# Patient Record
Sex: FEMALE | NOT HISPANIC OR LATINO | Employment: OTHER | URBAN - METROPOLITAN AREA
[De-identification: names, ages, dates, MRNs, and addresses within clinical notes are randomized per-mention and may not be internally consistent; named-entity substitution may affect disease eponyms.]

---

## 2021-06-08 ENCOUNTER — TRANSCRIBE ORDERS (OUTPATIENT)
Dept: PREADMISSION TESTING | Facility: HOSPITAL | Age: 66
End: 2021-06-08

## 2021-06-08 DIAGNOSIS — Z01.818 OTHER SPECIFIED PRE-OPERATIVE EXAMINATION: Primary | ICD-10-CM

## 2021-06-10 ENCOUNTER — HOSPITAL ENCOUNTER (OUTPATIENT)
Dept: GENERAL RADIOLOGY | Facility: HOSPITAL | Age: 66
Discharge: HOME | End: 2021-06-10

## 2021-06-10 ENCOUNTER — PRE-ADMISSION TESTING (OUTPATIENT)
Dept: PREADMISSION TESTING | Facility: HOSPITAL | Age: 66
End: 2021-06-10

## 2021-06-10 VITALS
DIASTOLIC BLOOD PRESSURE: 90 MMHG | WEIGHT: 218 LBS | HEIGHT: 67 IN | SYSTOLIC BLOOD PRESSURE: 150 MMHG | TEMPERATURE: 98.1 F | BODY MASS INDEX: 34.21 KG/M2 | RESPIRATION RATE: 16 BRPM | OXYGEN SATURATION: 98 % | HEART RATE: 91 BPM

## 2021-06-10 LAB
ALBUMIN SERPL-MCNC: 4.1 G/DL (ref 3.5–5.2)
ALBUMIN/GLOB SERPL: 1.3 G/DL
ALP SERPL-CCNC: 61 U/L (ref 39–117)
ALT SERPL W P-5'-P-CCNC: 12 U/L (ref 1–33)
ANION GAP SERPL CALCULATED.3IONS-SCNC: 8.6 MMOL/L (ref 5–15)
AST SERPL-CCNC: 14 U/L (ref 1–32)
BACTERIA UR QL AUTO: NORMAL /HPF
BILIRUB SERPL-MCNC: 0.3 MG/DL (ref 0–1.2)
BILIRUB UR QL STRIP: NEGATIVE
BUN SERPL-MCNC: 8 MG/DL (ref 8–23)
BUN/CREAT SERPL: 9.9 (ref 7–25)
CALCIUM SPEC-SCNC: 8.9 MG/DL (ref 8.6–10.5)
CHLORIDE SERPL-SCNC: 106 MMOL/L (ref 98–107)
CLARITY UR: CLEAR
CO2 SERPL-SCNC: 27.4 MMOL/L (ref 22–29)
COLOR UR: YELLOW
CREAT SERPL-MCNC: 0.81 MG/DL (ref 0.57–1)
DEPRECATED RDW RBC AUTO: 49.3 FL (ref 37–54)
ERYTHROCYTE [DISTWIDTH] IN BLOOD BY AUTOMATED COUNT: 14.1 % (ref 12.3–15.4)
GFR SERPL CREATININE-BSD FRML MDRD: 71 ML/MIN/1.73
GFR SERPL CREATININE-BSD FRML MDRD: 86 ML/MIN/1.73
GLOBULIN UR ELPH-MCNC: 3.1 GM/DL
GLUCOSE SERPL-MCNC: 83 MG/DL (ref 65–99)
GLUCOSE UR STRIP-MCNC: NEGATIVE MG/DL
HCT VFR BLD AUTO: 41.1 % (ref 34–46.6)
HGB BLD-MCNC: 13.2 G/DL (ref 12–15.9)
HGB UR QL STRIP.AUTO: NEGATIVE
HYALINE CASTS UR QL AUTO: NORMAL /LPF
KETONES UR QL STRIP: ABNORMAL
LEUKOCYTE ESTERASE UR QL STRIP.AUTO: NEGATIVE
MCH RBC QN AUTO: 30.3 PG (ref 26.6–33)
MCHC RBC AUTO-ENTMCNC: 32.1 G/DL (ref 31.5–35.7)
MCV RBC AUTO: 94.3 FL (ref 79–97)
NITRITE UR QL STRIP: NEGATIVE
PH UR STRIP.AUTO: 6 [PH] (ref 5–8)
PLATELET # BLD AUTO: 215 10*3/MM3 (ref 140–450)
PMV BLD AUTO: 9.7 FL (ref 6–12)
POTASSIUM SERPL-SCNC: 3.8 MMOL/L (ref 3.5–5.2)
PROT SERPL-MCNC: 7.2 G/DL (ref 6–8.5)
PROT UR QL STRIP: NEGATIVE
QT INTERVAL: 375 MS
RBC # BLD AUTO: 4.36 10*6/MM3 (ref 3.77–5.28)
RBC # UR: NORMAL /HPF
REF LAB TEST METHOD: NORMAL
SODIUM SERPL-SCNC: 142 MMOL/L (ref 136–145)
SP GR UR STRIP: 1.02 (ref 1–1.03)
SQUAMOUS #/AREA URNS HPF: NORMAL /HPF
UROBILINOGEN UR QL STRIP: ABNORMAL
WBC # BLD AUTO: 2.97 10*3/MM3 (ref 3.4–10.8)
WBC UR QL AUTO: NORMAL /HPF

## 2021-06-10 PROCEDURE — 36415 COLL VENOUS BLD VENIPUNCTURE: CPT

## 2021-06-10 PROCEDURE — 71046 X-RAY EXAM CHEST 2 VIEWS: CPT

## 2021-06-10 PROCEDURE — 85027 COMPLETE CBC AUTOMATED: CPT

## 2021-06-10 PROCEDURE — 93005 ELECTROCARDIOGRAM TRACING: CPT

## 2021-06-10 PROCEDURE — 93010 ELECTROCARDIOGRAM REPORT: CPT | Performed by: INTERNAL MEDICINE

## 2021-06-10 PROCEDURE — 80053 COMPREHEN METABOLIC PANEL: CPT

## 2021-06-10 PROCEDURE — 81001 URINALYSIS AUTO W/SCOPE: CPT

## 2021-06-10 RX ORDER — LOSARTAN POTASSIUM 50 MG/1
50 TABLET ORAL DAILY
COMMUNITY

## 2021-06-10 RX ORDER — HYDROCHLOROTHIAZIDE 25 MG/1
25 TABLET ORAL DAILY
COMMUNITY

## 2021-06-10 RX ORDER — TIZANIDINE 4 MG/1
4 TABLET ORAL NIGHTLY PRN
COMMUNITY

## 2021-06-10 RX ORDER — GABAPENTIN 800 MG/1
800 TABLET ORAL EVERY 4 HOURS PRN
COMMUNITY

## 2021-06-10 RX ORDER — HYDROCODONE BITARTRATE AND ACETAMINOPHEN 10; 325 MG/1; MG/1
1 TABLET ORAL EVERY 6 HOURS PRN
COMMUNITY
End: 2021-06-22 | Stop reason: HOSPADM

## 2021-06-10 ASSESSMENT — KOOS JR
KOOS JR SCORE: 28
KOOS JR SCORE: 0

## 2021-06-10 NOTE — DISCHARGE INSTRUCTIONS
CHLORHEXIDINE CLOTH INSTRUCTIONS  The morning of surgery follow these instructions using the Chlorhexidine cloths you've been given.  These steps reduce bacteria on the body.  Do not use the cloths near your eyes, ears mouth, genitalia or on open wounds.  Throw the cloths away after use but do not try to flush them down a toilet.      • Open and remove one cloth at a time from the package.    • Leave the cloth unfolded and begin the bathing.  • Massage the skin with the cloths using gentle pressure to remove bacteria.  Do not scrub harshly.   • Follow the steps below with one 2% CHG cloth per area (6 total cloths).  • One cloth for neck, shoulders and chest.  • One cloth for both arms, hands, fingers and underarms (do underarms last).  • One cloth for the abdomen followed by groin.  • One cloth for right leg and foot including between the toes.  • One cloth for left leg and foot including between the toes.  • The last cloth is to be used for the back of the neck, back and buttocks.    Allow the CHG to air dry 3 minutes on the skin which will give it time to work and decrease the chance of irritation.  The skin may feel sticky until it is dry.  Do not rinse with water or any other liquid or you will lose the beneficial effects of the CHG.  If mild skin irritation occurs, do rinse the skin to remove the CHG.  Report this to the nurse at time of admission.  Do not apply lotions, creams, ointments, deodorants or perfumes after using the clothes. Dress in clean clothes before coming to the hospital.    BACTROBAN NASAL OINTMENT  There are many germs normally in your nose. Bactroban is an ointment that will help reduce these germs. Please follow these instructions for Bactroban use:      ____The day before surgery in the morning  Date____6/20____    ____The day before surgery in the evening              Date___6/20_____    ____The day of surgery in the morning    Date__6/21______    **Squirt ½ package of Bactroban Ointment  onto a cotton applicator and apply to inside of 1st nostril.  Squirt the remaining Bactroban and apply to the inside of the other nostril.    ARRIVAL TIME 08:00      If you are on prescription narcotic pain medication to control your pain you may also take that medication the morning of surgery.    General Instructions:  • Do not eat solid food after midnight the night before surgery.  • You may drink clear liquids day of surgery but must stop at least one hour before your hospital arrival time.  • It is beneficial for you to have a clear drink that contains carbohydrates the day of surgery.  We suggest a 12 to 20 ounce bottle of Gatorade or Powerade for non-diabetic patients or a 12 to 20 ounce bottle of G2 or Powerade Zero for diabetic patients. (Pediatric patients, are not advised to drink a 12 to 20 ounce carbohydrate drink)    Clear liquids are liquids you can see through.  Nothing red in color.     Plain water                               Sports drinks  Sodas                                   Gelatin (Jell-O)  Fruit juices without pulp such as white grape juice and apple juice  Popsicles that contain no fruit or yogurt  Tea or coffee (no cream or milk added)  Gatorade / Powerade  G2 / Powerade Zero    • Patients who avoid smoking, chewing tobacco and alcohol for 4 weeks prior to surgery have a reduced risk of post-operative complications.  Quit smoking as many days before surgery as you can.  • Do not smoke, use chewing tobacco or drink alcohol the day of surgery.   • If applicable bring your C-PAP/ BI-PAP machine.  • Bring any papers given to you in the doctor’s office.  • Wear clean comfortable clothes.  • Do not wear contact lenses, false eyelashes or make-up.  Bring a case for your glasses.   • Bring crutches or walker if applicable.  • Remove all piercings.  Leave jewelry and any other valuables at home.  • Hair extensions with metal clips must be removed prior to surgery.  • The Pre-Admission Testing  nurse will instruct you to bring medications if unable to obtain an accurate list in Pre-Admission Testing.        Preventing a Surgical Site Infection:  • For 2 to 3 days before surgery, avoid shaving with a razor because the razor can irritate skin and make it easier to develop an infection.    • Any areas of open skin can increase the risk of a post-operative wound infection by allowing bacteria to enter and travel throughout the body.  Notify your surgeon if you have any skin wounds / rashes even if it is not near the expected surgical site.  The area will need assessed to determine if surgery should be delayed until it is healed.  • The night prior to surgery shower using a fresh bar of anti-bacterial soap (such as Dial) and clean washcloth.  Sleep in a clean bed with clean clothing.  Do not allow pets to sleep with you.  • Shower on the morning of surgery using a fresh bar of anti-bacterial soap (such as Dial) and clean washcloth.  Dry with a clean towel and dress in clean clothing.  • Ask your surgeon if you will be receiving antibiotics prior to surgery.  • Make sure you, your family, and all healthcare providers clean their hands with soap and water or an alcohol based hand  before caring for you or your wound.    Day of surgery:  Your arrival time is approximately two hours before your scheduled surgery time.  Upon arrival, a Pre-op nurse and Anesthesiologist will review your health history, obtain vital signs, and answer questions you may have.  The only belongings needed at this time will be a list of your home medications and if applicable your C-PAP/BI-PAP machine.  A Pre-op nurse will start an IV and you may receive medication in preparation for surgery, including something to help you relax.     Please be aware that surgery does come with discomfort.  We want to make every effort to control your discomfort so please discuss any uncontrolled symptoms with your nurse.   Your doctor will most  likely have prescribed pain medications.      If you are going home after surgery you will receive individualized written care instructions before being discharged.  A responsible adult must drive you to and from the hospital on the day of your surgery and stay with you for 24 hours.  Discharge prescriptions can be filled by the hospital pharmacy during regular pharmacy hours.  If you are having surgery late in the day/evening your prescription may be e-prescribed to your pharmacy.  Please verify your pharmacy hours or chose a 24 hour pharmacy to avoid not having access to your prescription because your pharmacy has closed for the day.    If you are staying overnight following surgery, you will be transported to your hospital room following the recovery period.  [facility] has all private rooms.    If you have any questions please call Pre-Admission Testing at [phone].  Deductibles and co-payments are collected on the day of service. Please be prepared to pay the required co-pay, deductible or deposit on the day of service as defined by your plan.    Patient Education for Self-Quarantine Process    Following your COVID testing, we strongly recommend that you do not leave your home after you have been tested for COVID except to get medical care. This includes not going to work, school or to public areas.  If this is not possible for you to do please limit your activities to only required outings.  Be sure to wear a mask when you are with other people, practice social distancing and wash your hands frequently.      The following items provide additional details to keep you safe.  • Wash your hands with soap and water frequently for at least 20 seconds.   • Avoid touching your eyes, nose and mouth with unwashed hands.  • Do not share anything - utensils, towels, food from the same bowl.   • Have your own utensils, drinking glass, dishes, towels and bedding.   • Do not have visitors.   • Do use  FaceTime to stay in touch with family and friends.  • You should stay in a specific room away from others if possible.   • Stay at least 6 feet away from others in the home if you cannot have a dedicated room to yourself.   • Do not snuggle with your pet. While the CDC says there is no evidence that pets can spread COVID-19 or be infected from humans, it is probably best to avoid “petting, snuggling, being kissed or licked and sharing food (during self-quarantine)”, according to the CDC.   • Sanitize household surfaces daily. Include all high touch areas (door handles, light switches, phones, countertops, etc.)  • Do not share a bathroom with others, if possible.   • Wear a mask around others in your home if you are unable to stay in a separate room or 6 feet apart. If  you are unable to wear a mask, have your family member wear a mask if they must be within 6 feet of you.   Call your surgeon immediately if you experience any of the following symptoms:  • Sore Throat  • Shortness of Breath or difficulty breathing  • Cough  • Chills  • Body soreness or muscle pain  • Headache  • Fever  • New loss of taste or smell  • Do not arrive for your surgery ill.  Your procedure will need to be rescheduled to another time.  You will need to call your physician before the day of surgery to avoid any unnecessary exposure to hospital staff as well as other patients.

## 2021-06-18 ENCOUNTER — LAB (OUTPATIENT)
Dept: LAB | Facility: HOSPITAL | Age: 66
End: 2021-06-18

## 2021-06-18 DIAGNOSIS — Z01.818 OTHER SPECIFIED PRE-OPERATIVE EXAMINATION: ICD-10-CM

## 2021-06-18 LAB — SARS-COV-2 ORF1AB RESP QL NAA+PROBE: NOT DETECTED

## 2021-06-18 PROCEDURE — U0004 COV-19 TEST NON-CDC HGH THRU: HCPCS

## 2021-06-18 PROCEDURE — C9803 HOPD COVID-19 SPEC COLLECT: HCPCS

## 2021-06-21 ENCOUNTER — ANESTHESIA EVENT (OUTPATIENT)
Dept: PERIOP | Facility: HOSPITAL | Age: 66
End: 2021-06-21

## 2021-06-21 ENCOUNTER — ANESTHESIA (OUTPATIENT)
Dept: PERIOP | Facility: HOSPITAL | Age: 66
End: 2021-06-21

## 2021-06-21 ENCOUNTER — APPOINTMENT (OUTPATIENT)
Dept: GENERAL RADIOLOGY | Facility: HOSPITAL | Age: 66
End: 2021-06-21

## 2021-06-21 ENCOUNTER — HOSPITAL ENCOUNTER (OUTPATIENT)
Facility: HOSPITAL | Age: 66
Discharge: HOME HEALTH | End: 2021-06-22
Attending: ORTHOPAEDIC SURGERY | Admitting: ORTHOPAEDIC SURGERY

## 2021-06-21 DIAGNOSIS — M17.11 PRIMARY OSTEOARTHRITIS OF RIGHT KNEE: Primary | ICD-10-CM

## 2021-06-21 LAB
HCT VFR BLD AUTO: 36 % (ref 34–46.6)
HGB BLD-MCNC: 12.1 G/DL (ref 12–15.9)

## 2021-06-21 PROCEDURE — A9270 NON-COVERED ITEM OR SERVICE: HCPCS | Performed by: ORTHOPAEDIC SURGERY

## 2021-06-21 PROCEDURE — 25010000002 HYDROMORPHONE PER 4 MG: Performed by: NURSE ANESTHETIST, CERTIFIED REGISTERED

## 2021-06-21 PROCEDURE — 25010000002 NEOSTIGMINE 5 MG/10ML SOLUTION: Performed by: ANESTHESIOLOGY

## 2021-06-21 PROCEDURE — 73560 X-RAY EXAM OF KNEE 1 OR 2: CPT

## 2021-06-21 PROCEDURE — 25010000002 HYDROMORPHONE 1 MG/ML SOLUTION: Performed by: ORTHOPAEDIC SURGERY

## 2021-06-21 PROCEDURE — G0378 HOSPITAL OBSERVATION PER HR: HCPCS

## 2021-06-21 PROCEDURE — 25010000002 EPINEPHRINE 30 MG/30ML SOLUTION

## 2021-06-21 PROCEDURE — 25010000002 ONDANSETRON PER 1 MG: Performed by: ORTHOPAEDIC SURGERY

## 2021-06-21 PROCEDURE — 63710000001 GABAPENTIN 400 MG CAPSULE: Performed by: ORTHOPAEDIC SURGERY

## 2021-06-21 PROCEDURE — C1713 ANCHOR/SCREW BN/BN,TIS/BN: HCPCS | Performed by: ORTHOPAEDIC SURGERY

## 2021-06-21 PROCEDURE — 25010000003 CEFAZOLIN IN DEXTROSE 2-4 GM/100ML-% SOLUTION: Performed by: ORTHOPAEDIC SURGERY

## 2021-06-21 PROCEDURE — 25010000002 PROPOFOL 10 MG/ML EMULSION: Performed by: NURSE ANESTHETIST, CERTIFIED REGISTERED

## 2021-06-21 PROCEDURE — 25010000002 ONDANSETRON PER 1 MG: Performed by: ANESTHESIOLOGY

## 2021-06-21 PROCEDURE — C1776 JOINT DEVICE (IMPLANTABLE): HCPCS | Performed by: ORTHOPAEDIC SURGERY

## 2021-06-21 PROCEDURE — 85018 HEMOGLOBIN: CPT | Performed by: ORTHOPAEDIC SURGERY

## 2021-06-21 PROCEDURE — 25010000002 KETOROLAC TROMETHAMINE PER 15 MG

## 2021-06-21 PROCEDURE — 25010000002 MIDAZOLAM PER 1 MG: Performed by: ANESTHESIOLOGY

## 2021-06-21 PROCEDURE — C1889 IMPLANT/INSERT DEVICE, NOC: HCPCS | Performed by: ORTHOPAEDIC SURGERY

## 2021-06-21 PROCEDURE — 63710000001 LOSARTAN 50 MG TABLET: Performed by: ORTHOPAEDIC SURGERY

## 2021-06-21 PROCEDURE — 25010000002 KETOROLAC TROMETHAMINE PER 15 MG: Performed by: ANESTHESIOLOGY

## 2021-06-21 PROCEDURE — 25010000002 ROPIVACAINE PER 1 MG

## 2021-06-21 PROCEDURE — 25010000002 DEXAMETHASONE PER 1 MG: Performed by: NURSE ANESTHETIST, CERTIFIED REGISTERED

## 2021-06-21 PROCEDURE — 25010000002 FENTANYL CITRATE (PF) 50 MCG/ML SOLUTION: Performed by: NURSE ANESTHETIST, CERTIFIED REGISTERED

## 2021-06-21 PROCEDURE — 63710000001 HYDROCHLOROTHIAZIDE 25 MG TABLET: Performed by: ORTHOPAEDIC SURGERY

## 2021-06-21 PROCEDURE — 85014 HEMATOCRIT: CPT | Performed by: ORTHOPAEDIC SURGERY

## 2021-06-21 PROCEDURE — 63710000001 FAMOTIDINE 20 MG TABLET: Performed by: ORTHOPAEDIC SURGERY

## 2021-06-21 DEVICE — COMP FEM/KN PERSONA CR CMT COCR STD SZ8 RT: Type: IMPLANTABLE DEVICE | Site: KNEE | Status: FUNCTIONAL

## 2021-06-21 DEVICE — SCRW HEX PERSONA FML 2.5X25MM PK/2: Type: IMPLANTABLE DEVICE | Site: KNEE | Status: FUNCTIONAL

## 2021-06-21 DEVICE — DEV CONTRL TISS STRATAFIX SYMM PDS PLUS VIL CT-1 45CM: Type: IMPLANTABLE DEVICE | Site: KNEE | Status: FUNCTIONAL

## 2021-06-21 DEVICE — DEV CONTRL TISS STRATAFIX SPIRAL MNCRYL UD 3/0 PLS 30CM: Type: IMPLANTABLE DEVICE | Site: KNEE | Status: FUNCTIONAL

## 2021-06-21 DEVICE — ART/SRF KN PERSONA/VE MC EF 8TO11 10MM RT: Type: IMPLANTABLE DEVICE | Site: KNEE | Status: FUNCTIONAL

## 2021-06-21 DEVICE — STEM TIB/KN PERSONA CMT 5D SZE RT: Type: IMPLANTABLE DEVICE | Site: KNEE | Status: FUNCTIONAL

## 2021-06-21 DEVICE — PAT NXGN POR 10X32MM: Type: IMPLANTABLE DEVICE | Site: KNEE | Status: FUNCTIONAL

## 2021-06-21 DEVICE — CAP TOTL KN POROUS/HYBRID PRIMARY: Type: IMPLANTABLE DEVICE | Site: KNEE | Status: FUNCTIONAL

## 2021-06-21 DEVICE — CMT BONE R 1X40: Type: IMPLANTABLE DEVICE | Site: KNEE | Status: FUNCTIONAL

## 2021-06-21 RX ORDER — NALOXONE HCL 0.4 MG/ML
0.2 VIAL (ML) INJECTION AS NEEDED
Status: DISCONTINUED | OUTPATIENT
Start: 2021-06-21 | End: 2021-06-21 | Stop reason: HOSPADM

## 2021-06-21 RX ORDER — TIZANIDINE 4 MG/1
4 TABLET ORAL NIGHTLY PRN
Status: DISCONTINUED | OUTPATIENT
Start: 2021-06-21 | End: 2021-06-22 | Stop reason: HOSPADM

## 2021-06-21 RX ORDER — SODIUM CHLORIDE 0.9 % (FLUSH) 0.9 %
1-10 SYRINGE (ML) INJECTION AS NEEDED
Status: DISCONTINUED | OUTPATIENT
Start: 2021-06-21 | End: 2021-06-22 | Stop reason: HOSPADM

## 2021-06-21 RX ORDER — SODIUM CHLORIDE 0.9 % (FLUSH) 0.9 %
3 SYRINGE (ML) INJECTION EVERY 12 HOURS SCHEDULED
Status: DISCONTINUED | OUTPATIENT
Start: 2021-06-21 | End: 2021-06-21 | Stop reason: HOSPADM

## 2021-06-21 RX ORDER — DOCUSATE SODIUM 100 MG/1
100-200 CAPSULE, LIQUID FILLED ORAL 2 TIMES DAILY
Qty: 60 CAPSULE | Refills: 1 | Status: SHIPPED | OUTPATIENT
Start: 2021-06-21

## 2021-06-21 RX ORDER — ACETAMINOPHEN 325 MG/1
325 TABLET ORAL EVERY 4 HOURS PRN
Status: DISCONTINUED | OUTPATIENT
Start: 2021-06-21 | End: 2021-06-22 | Stop reason: HOSPADM

## 2021-06-21 RX ORDER — OXYCODONE AND ACETAMINOPHEN 10; 325 MG/1; MG/1
1 TABLET ORAL EVERY 4 HOURS PRN
Status: DISCONTINUED | OUTPATIENT
Start: 2021-06-21 | End: 2021-06-21 | Stop reason: HOSPADM

## 2021-06-21 RX ORDER — CEFAZOLIN SODIUM 2 G/100ML
2 INJECTION, SOLUTION INTRAVENOUS ONCE
Status: COMPLETED | OUTPATIENT
Start: 2021-06-21 | End: 2021-06-21

## 2021-06-21 RX ORDER — HYDROCODONE BITARTRATE AND ACETAMINOPHEN 7.5; 325 MG/1; MG/1
1 TABLET ORAL ONCE AS NEEDED
Status: DISCONTINUED | OUTPATIENT
Start: 2021-06-21 | End: 2021-06-21 | Stop reason: HOSPADM

## 2021-06-21 RX ORDER — FENTANYL CITRATE 50 UG/ML
50 INJECTION, SOLUTION INTRAMUSCULAR; INTRAVENOUS
Status: DISCONTINUED | OUTPATIENT
Start: 2021-06-21 | End: 2021-06-21 | Stop reason: HOSPADM

## 2021-06-21 RX ORDER — SODIUM CHLORIDE, SODIUM LACTATE, POTASSIUM CHLORIDE, CALCIUM CHLORIDE 600; 310; 30; 20 MG/100ML; MG/100ML; MG/100ML; MG/100ML
9 INJECTION, SOLUTION INTRAVENOUS CONTINUOUS
Status: DISCONTINUED | OUTPATIENT
Start: 2021-06-21 | End: 2021-06-21

## 2021-06-21 RX ORDER — LIDOCAINE HYDROCHLORIDE 20 MG/ML
INJECTION, SOLUTION INFILTRATION; PERINEURAL AS NEEDED
Status: DISCONTINUED | OUTPATIENT
Start: 2021-06-21 | End: 2021-06-21 | Stop reason: SURG

## 2021-06-21 RX ORDER — ASPIRIN 81 MG/1
81 TABLET ORAL EVERY 12 HOURS SCHEDULED
Status: DISCONTINUED | OUTPATIENT
Start: 2021-06-22 | End: 2021-06-22 | Stop reason: HOSPADM

## 2021-06-21 RX ORDER — PROMETHAZINE HYDROCHLORIDE 25 MG/1
25 TABLET ORAL ONCE AS NEEDED
Status: DISCONTINUED | OUTPATIENT
Start: 2021-06-21 | End: 2021-06-21 | Stop reason: HOSPADM

## 2021-06-21 RX ORDER — SODIUM CHLORIDE 0.9 % (FLUSH) 0.9 %
3 SYRINGE (ML) INJECTION EVERY 12 HOURS SCHEDULED
Status: DISCONTINUED | OUTPATIENT
Start: 2021-06-21 | End: 2021-06-22 | Stop reason: HOSPADM

## 2021-06-21 RX ORDER — PROMETHAZINE HYDROCHLORIDE 25 MG/1
25 SUPPOSITORY RECTAL ONCE AS NEEDED
Status: DISCONTINUED | OUTPATIENT
Start: 2021-06-21 | End: 2021-06-21 | Stop reason: HOSPADM

## 2021-06-21 RX ORDER — ONDANSETRON 2 MG/ML
4 INJECTION INTRAMUSCULAR; INTRAVENOUS EVERY 6 HOURS PRN
Status: DISCONTINUED | OUTPATIENT
Start: 2021-06-21 | End: 2021-06-22 | Stop reason: HOSPADM

## 2021-06-21 RX ORDER — LABETALOL HYDROCHLORIDE 5 MG/ML
INJECTION, SOLUTION INTRAVENOUS AS NEEDED
Status: DISCONTINUED | OUTPATIENT
Start: 2021-06-21 | End: 2021-06-21 | Stop reason: SURG

## 2021-06-21 RX ORDER — LOSARTAN POTASSIUM 50 MG/1
50 TABLET ORAL DAILY
Status: DISCONTINUED | OUTPATIENT
Start: 2021-06-21 | End: 2021-06-22 | Stop reason: HOSPADM

## 2021-06-21 RX ORDER — SODIUM CHLORIDE, SODIUM LACTATE, POTASSIUM CHLORIDE, CALCIUM CHLORIDE 600; 310; 30; 20 MG/100ML; MG/100ML; MG/100ML; MG/100ML
100 INJECTION, SOLUTION INTRAVENOUS CONTINUOUS
Status: DISCONTINUED | OUTPATIENT
Start: 2021-06-21 | End: 2021-06-22 | Stop reason: HOSPADM

## 2021-06-21 RX ORDER — FENTANYL CITRATE 50 UG/ML
50 INJECTION, SOLUTION INTRAMUSCULAR; INTRAVENOUS
Status: COMPLETED | OUTPATIENT
Start: 2021-06-21 | End: 2021-06-21

## 2021-06-21 RX ORDER — IBUPROFEN 600 MG/1
600 TABLET ORAL ONCE AS NEEDED
Status: DISCONTINUED | OUTPATIENT
Start: 2021-06-21 | End: 2021-06-21 | Stop reason: HOSPADM

## 2021-06-21 RX ORDER — MIDAZOLAM HYDROCHLORIDE 1 MG/ML
1 INJECTION INTRAMUSCULAR; INTRAVENOUS
Status: DISCONTINUED | OUTPATIENT
Start: 2021-06-21 | End: 2021-06-21 | Stop reason: HOSPADM

## 2021-06-21 RX ORDER — EPHEDRINE SULFATE 50 MG/ML
INJECTION, SOLUTION INTRAVENOUS AS NEEDED
Status: DISCONTINUED | OUTPATIENT
Start: 2021-06-21 | End: 2021-06-21 | Stop reason: SURG

## 2021-06-21 RX ORDER — FENTANYL CITRATE 50 UG/ML
INJECTION, SOLUTION INTRAMUSCULAR; INTRAVENOUS AS NEEDED
Status: DISCONTINUED | OUTPATIENT
Start: 2021-06-21 | End: 2021-06-21 | Stop reason: SURG

## 2021-06-21 RX ORDER — ONDANSETRON 2 MG/ML
4 INJECTION INTRAMUSCULAR; INTRAVENOUS ONCE AS NEEDED
Status: DISCONTINUED | OUTPATIENT
Start: 2021-06-21 | End: 2021-06-21 | Stop reason: HOSPADM

## 2021-06-21 RX ORDER — EPHEDRINE SULFATE 50 MG/ML
5 INJECTION, SOLUTION INTRAVENOUS ONCE AS NEEDED
Status: DISCONTINUED | OUTPATIENT
Start: 2021-06-21 | End: 2021-06-21 | Stop reason: HOSPADM

## 2021-06-21 RX ORDER — MAGNESIUM HYDROXIDE 1200 MG/15ML
LIQUID ORAL AS NEEDED
Status: DISCONTINUED | OUTPATIENT
Start: 2021-06-21 | End: 2021-06-21 | Stop reason: HOSPADM

## 2021-06-21 RX ORDER — HYDROMORPHONE HCL 110MG/55ML
PATIENT CONTROLLED ANALGESIA SYRINGE INTRAVENOUS AS NEEDED
Status: DISCONTINUED | OUTPATIENT
Start: 2021-06-21 | End: 2021-06-21 | Stop reason: SURG

## 2021-06-21 RX ORDER — DEXAMETHASONE SODIUM PHOSPHATE 10 MG/ML
INJECTION INTRAMUSCULAR; INTRAVENOUS AS NEEDED
Status: DISCONTINUED | OUTPATIENT
Start: 2021-06-21 | End: 2021-06-21 | Stop reason: SURG

## 2021-06-21 RX ORDER — NALOXONE HCL 0.4 MG/ML
0.1 VIAL (ML) INJECTION
Status: DISCONTINUED | OUTPATIENT
Start: 2021-06-21 | End: 2021-06-22 | Stop reason: HOSPADM

## 2021-06-21 RX ORDER — LIDOCAINE HYDROCHLORIDE 40 MG/ML
SOLUTION TOPICAL AS NEEDED
Status: DISCONTINUED | OUTPATIENT
Start: 2021-06-21 | End: 2021-06-21 | Stop reason: SURG

## 2021-06-21 RX ORDER — FAMOTIDINE 20 MG/1
40 TABLET, FILM COATED ORAL DAILY
Status: DISCONTINUED | OUTPATIENT
Start: 2021-06-21 | End: 2021-06-22 | Stop reason: HOSPADM

## 2021-06-21 RX ORDER — UREA 10 %
1 LOTION (ML) TOPICAL NIGHTLY PRN
Status: DISCONTINUED | OUTPATIENT
Start: 2021-06-21 | End: 2021-06-22 | Stop reason: HOSPADM

## 2021-06-21 RX ORDER — OXYCODONE AND ACETAMINOPHEN 10; 325 MG/1; MG/1
1-2 TABLET ORAL EVERY 4 HOURS PRN
Qty: 56 TABLET | Refills: 0 | Status: SHIPPED | OUTPATIENT
Start: 2021-06-21 | End: 2021-07-01

## 2021-06-21 RX ORDER — LIDOCAINE HYDROCHLORIDE 10 MG/ML
0.5 INJECTION, SOLUTION EPIDURAL; INFILTRATION; INTRACAUDAL; PERINEURAL ONCE AS NEEDED
Status: DISCONTINUED | OUTPATIENT
Start: 2021-06-21 | End: 2021-06-21 | Stop reason: HOSPADM

## 2021-06-21 RX ORDER — PROPOFOL 10 MG/ML
VIAL (ML) INTRAVENOUS AS NEEDED
Status: DISCONTINUED | OUTPATIENT
Start: 2021-06-21 | End: 2021-06-21 | Stop reason: SURG

## 2021-06-21 RX ORDER — ONDANSETRON 4 MG/1
4 TABLET, FILM COATED ORAL EVERY 6 HOURS PRN
Status: DISCONTINUED | OUTPATIENT
Start: 2021-06-21 | End: 2021-06-22 | Stop reason: HOSPADM

## 2021-06-21 RX ORDER — OXYCODONE AND ACETAMINOPHEN 10; 325 MG/1; MG/1
2 TABLET ORAL EVERY 4 HOURS PRN
Status: DISCONTINUED | OUTPATIENT
Start: 2021-06-21 | End: 2021-06-22 | Stop reason: HOSPADM

## 2021-06-21 RX ORDER — GABAPENTIN 400 MG/1
800 CAPSULE ORAL EVERY 8 HOURS SCHEDULED
Status: DISCONTINUED | OUTPATIENT
Start: 2021-06-21 | End: 2021-06-22 | Stop reason: HOSPADM

## 2021-06-21 RX ORDER — MIDAZOLAM HYDROCHLORIDE 1 MG/ML
0.5 INJECTION INTRAMUSCULAR; INTRAVENOUS
Status: DISCONTINUED | OUTPATIENT
Start: 2021-06-21 | End: 2021-06-21 | Stop reason: HOSPADM

## 2021-06-21 RX ORDER — GLYCOPYRROLATE 0.2 MG/ML
INJECTION INTRAMUSCULAR; INTRAVENOUS AS NEEDED
Status: DISCONTINUED | OUTPATIENT
Start: 2021-06-21 | End: 2021-06-21 | Stop reason: SURG

## 2021-06-21 RX ORDER — HYDROMORPHONE HYDROCHLORIDE 1 MG/ML
0.5 INJECTION, SOLUTION INTRAMUSCULAR; INTRAVENOUS; SUBCUTANEOUS
Status: COMPLETED | OUTPATIENT
Start: 2021-06-21 | End: 2021-06-21

## 2021-06-21 RX ORDER — TRANEXAMIC ACID 100 MG/ML
INJECTION, SOLUTION INTRAVENOUS AS NEEDED
Status: DISCONTINUED | OUTPATIENT
Start: 2021-06-21 | End: 2021-06-21 | Stop reason: SURG

## 2021-06-21 RX ORDER — ROCURONIUM BROMIDE 10 MG/ML
INJECTION, SOLUTION INTRAVENOUS AS NEEDED
Status: DISCONTINUED | OUTPATIENT
Start: 2021-06-21 | End: 2021-06-21 | Stop reason: SURG

## 2021-06-21 RX ORDER — ONDANSETRON 2 MG/ML
INJECTION INTRAMUSCULAR; INTRAVENOUS AS NEEDED
Status: DISCONTINUED | OUTPATIENT
Start: 2021-06-21 | End: 2021-06-21 | Stop reason: SURG

## 2021-06-21 RX ORDER — KETOROLAC TROMETHAMINE 30 MG/ML
INJECTION, SOLUTION INTRAMUSCULAR; INTRAVENOUS AS NEEDED
Status: DISCONTINUED | OUTPATIENT
Start: 2021-06-21 | End: 2021-06-21 | Stop reason: SURG

## 2021-06-21 RX ORDER — ASPIRIN 81 MG/1
81 TABLET ORAL EVERY 12 HOURS SCHEDULED
Qty: 60 TABLET | Refills: 1 | Status: SHIPPED | OUTPATIENT
Start: 2021-06-22 | End: 2021-09-17

## 2021-06-21 RX ORDER — DIPHENHYDRAMINE HYDROCHLORIDE 50 MG/ML
12.5 INJECTION INTRAMUSCULAR; INTRAVENOUS
Status: DISCONTINUED | OUTPATIENT
Start: 2021-06-21 | End: 2021-06-21 | Stop reason: HOSPADM

## 2021-06-21 RX ORDER — FLUMAZENIL 0.1 MG/ML
0.2 INJECTION INTRAVENOUS AS NEEDED
Status: DISCONTINUED | OUTPATIENT
Start: 2021-06-21 | End: 2021-06-21 | Stop reason: HOSPADM

## 2021-06-21 RX ORDER — DIPHENHYDRAMINE HCL 25 MG
25 CAPSULE ORAL
Status: DISCONTINUED | OUTPATIENT
Start: 2021-06-21 | End: 2021-06-21 | Stop reason: HOSPADM

## 2021-06-21 RX ORDER — SODIUM CHLORIDE 0.9 % (FLUSH) 0.9 %
3-10 SYRINGE (ML) INJECTION AS NEEDED
Status: DISCONTINUED | OUTPATIENT
Start: 2021-06-21 | End: 2021-06-21 | Stop reason: HOSPADM

## 2021-06-21 RX ORDER — HYDROCHLOROTHIAZIDE 25 MG/1
25 TABLET ORAL DAILY
Status: DISCONTINUED | OUTPATIENT
Start: 2021-06-21 | End: 2021-06-22 | Stop reason: HOSPADM

## 2021-06-21 RX ORDER — FAMOTIDINE 10 MG/ML
20 INJECTION, SOLUTION INTRAVENOUS ONCE
Status: COMPLETED | OUTPATIENT
Start: 2021-06-21 | End: 2021-06-21

## 2021-06-21 RX ORDER — NEOSTIGMINE METHYLSULFATE 0.5 MG/ML
INJECTION, SOLUTION INTRAVENOUS AS NEEDED
Status: DISCONTINUED | OUTPATIENT
Start: 2021-06-21 | End: 2021-06-21 | Stop reason: SURG

## 2021-06-21 RX ORDER — LABETALOL HYDROCHLORIDE 5 MG/ML
5 INJECTION, SOLUTION INTRAVENOUS
Status: DISCONTINUED | OUTPATIENT
Start: 2021-06-21 | End: 2021-06-21 | Stop reason: HOSPADM

## 2021-06-21 RX ORDER — HYDRALAZINE HYDROCHLORIDE 20 MG/ML
5 INJECTION INTRAMUSCULAR; INTRAVENOUS
Status: DISCONTINUED | OUTPATIENT
Start: 2021-06-21 | End: 2021-06-21 | Stop reason: HOSPADM

## 2021-06-21 RX ORDER — OXYCODONE HYDROCHLORIDE AND ACETAMINOPHEN 5; 325 MG/1; MG/1
2 TABLET ORAL EVERY 4 HOURS PRN
Status: DISCONTINUED | OUTPATIENT
Start: 2021-06-21 | End: 2021-06-22 | Stop reason: HOSPADM

## 2021-06-21 RX ORDER — CEFAZOLIN SODIUM 2 G/100ML
2 INJECTION, SOLUTION INTRAVENOUS EVERY 8 HOURS
Status: COMPLETED | OUTPATIENT
Start: 2021-06-21 | End: 2021-06-22

## 2021-06-21 RX ADMIN — LABETALOL HYDROCHLORIDE 5 MG: 5 INJECTION, SOLUTION INTRAVENOUS at 15:54

## 2021-06-21 RX ADMIN — SODIUM CHLORIDE, POTASSIUM CHLORIDE, SODIUM LACTATE AND CALCIUM CHLORIDE 9 ML/HR: 600; 310; 30; 20 INJECTION, SOLUTION INTRAVENOUS at 10:13

## 2021-06-21 RX ADMIN — HYDROMORPHONE HYDROCHLORIDE 0.5 MG: 1 INJECTION, SOLUTION INTRAMUSCULAR; INTRAVENOUS; SUBCUTANEOUS at 17:17

## 2021-06-21 RX ADMIN — NEOSTIGMINE METHYLSULFATE 3.5 MG: 0.5 INJECTION INTRAVENOUS at 16:24

## 2021-06-21 RX ADMIN — PROPOFOL 100 MG: 10 INJECTION, EMULSION INTRAVENOUS at 15:00

## 2021-06-21 RX ADMIN — TRANEXAMIC ACID 1000 MG: 1 INJECTION, SOLUTION INTRAVENOUS at 15:08

## 2021-06-21 RX ADMIN — FAMOTIDINE 40 MG: 20 TABLET, FILM COATED ORAL at 20:17

## 2021-06-21 RX ADMIN — LOSARTAN POTASSIUM 50 MG: 50 TABLET, FILM COATED ORAL at 21:36

## 2021-06-21 RX ADMIN — LABETALOL HYDROCHLORIDE 5 MG: 5 INJECTION, SOLUTION INTRAVENOUS at 16:13

## 2021-06-21 RX ADMIN — LIDOCAINE HYDROCHLORIDE 60 MG: 20 INJECTION, SOLUTION INFILTRATION; PERINEURAL at 14:55

## 2021-06-21 RX ADMIN — LABETALOL HYDROCHLORIDE 5 MG: 5 INJECTION, SOLUTION INTRAVENOUS at 16:06

## 2021-06-21 RX ADMIN — LABETALOL HYDROCHLORIDE 5 MG: 5 INJECTION, SOLUTION INTRAVENOUS at 16:53

## 2021-06-21 RX ADMIN — CEFAZOLIN SODIUM 2 G: 2 INJECTION, SOLUTION INTRAVENOUS at 22:00

## 2021-06-21 RX ADMIN — FENTANYL CITRATE 50 MCG: 50 INJECTION, SOLUTION INTRAMUSCULAR; INTRAVENOUS at 17:31

## 2021-06-21 RX ADMIN — HYDROMORPHONE HYDROCHLORIDE 0.5 MG: 2 INJECTION, SOLUTION INTRAMUSCULAR; INTRAVENOUS; SUBCUTANEOUS at 15:26

## 2021-06-21 RX ADMIN — HYDROMORPHONE HYDROCHLORIDE 0.5 MG: 1 INJECTION, SOLUTION INTRAMUSCULAR; INTRAVENOUS; SUBCUTANEOUS at 17:33

## 2021-06-21 RX ADMIN — FENTANYL CITRATE 50 MCG: 50 INJECTION, SOLUTION INTRAMUSCULAR; INTRAVENOUS at 17:16

## 2021-06-21 RX ADMIN — LIDOCAINE HYDROCHLORIDE 1 EACH: 40 SOLUTION TOPICAL at 14:58

## 2021-06-21 RX ADMIN — FENTANYL CITRATE 50 MCG: 50 INJECTION INTRAMUSCULAR; INTRAVENOUS at 14:55

## 2021-06-21 RX ADMIN — DEXAMETHASONE SODIUM PHOSPHATE 8 MG: 10 INJECTION INTRAMUSCULAR; INTRAVENOUS at 15:04

## 2021-06-21 RX ADMIN — HYDROMORPHONE HYDROCHLORIDE 0.5 MG: 1 INJECTION, SOLUTION INTRAMUSCULAR; INTRAVENOUS; SUBCUTANEOUS at 17:57

## 2021-06-21 RX ADMIN — MIDAZOLAM 1 MG: 1 INJECTION INTRAMUSCULAR; INTRAVENOUS at 11:39

## 2021-06-21 RX ADMIN — KETOROLAC TROMETHAMINE 30 MG: 30 INJECTION, SOLUTION INTRAMUSCULAR at 16:19

## 2021-06-21 RX ADMIN — HYDROMORPHONE HYDROCHLORIDE 0.5 MG: 2 INJECTION, SOLUTION INTRAMUSCULAR; INTRAVENOUS; SUBCUTANEOUS at 15:48

## 2021-06-21 RX ADMIN — GABAPENTIN 800 MG: 400 CAPSULE ORAL at 21:36

## 2021-06-21 RX ADMIN — ONDANSETRON 4 MG: 2 INJECTION INTRAMUSCULAR; INTRAVENOUS at 22:00

## 2021-06-21 RX ADMIN — GLYCOPYRROLATE 0.6 MG: 0.2 INJECTION INTRAMUSCULAR; INTRAVENOUS at 16:24

## 2021-06-21 RX ADMIN — ONDANSETRON 4 MG: 2 INJECTION INTRAMUSCULAR; INTRAVENOUS at 15:59

## 2021-06-21 RX ADMIN — HYDROMORPHONE HYDROCHLORIDE 0.5 MG: 1 INJECTION, SOLUTION INTRAMUSCULAR; INTRAVENOUS; SUBCUTANEOUS at 17:25

## 2021-06-21 RX ADMIN — FAMOTIDINE 20 MG: 10 INJECTION INTRAVENOUS at 10:13

## 2021-06-21 RX ADMIN — ROCURONIUM BROMIDE 40 MG: 50 INJECTION INTRAVENOUS at 14:55

## 2021-06-21 RX ADMIN — FENTANYL CITRATE 50 MCG: 50 INJECTION, SOLUTION INTRAMUSCULAR; INTRAVENOUS at 17:50

## 2021-06-21 RX ADMIN — LABETALOL HYDROCHLORIDE 5 MG: 5 INJECTION, SOLUTION INTRAVENOUS at 17:24

## 2021-06-21 RX ADMIN — HYDROMORPHONE HYDROCHLORIDE 1 MG: 1 INJECTION, SOLUTION INTRAMUSCULAR; INTRAVENOUS; SUBCUTANEOUS at 20:16

## 2021-06-21 RX ADMIN — FENTANYL CITRATE 50 MCG: 50 INJECTION, SOLUTION INTRAMUSCULAR; INTRAVENOUS at 17:20

## 2021-06-21 RX ADMIN — PROPOFOL 200 MG: 10 INJECTION, EMULSION INTRAVENOUS at 14:55

## 2021-06-21 RX ADMIN — SODIUM CHLORIDE, POTASSIUM CHLORIDE, SODIUM LACTATE AND CALCIUM CHLORIDE 100 ML/HR: 600; 310; 30; 20 INJECTION, SOLUTION INTRAVENOUS at 20:17

## 2021-06-21 RX ADMIN — HYDROCHLOROTHIAZIDE 25 MG: 25 TABLET ORAL at 21:36

## 2021-06-21 RX ADMIN — EPHEDRINE SULFATE 10 MG: 50 INJECTION INTRAVENOUS at 15:39

## 2021-06-21 RX ADMIN — LABETALOL HYDROCHLORIDE 5 MG: 5 INJECTION, SOLUTION INTRAVENOUS at 17:19

## 2021-06-21 RX ADMIN — FENTANYL CITRATE 50 MCG: 50 INJECTION INTRAMUSCULAR; INTRAVENOUS at 15:20

## 2021-06-21 RX ADMIN — CEFAZOLIN SODIUM 2 G: 2 INJECTION, SOLUTION INTRAVENOUS at 14:47

## 2021-06-21 NOTE — ANESTHESIA PROCEDURE NOTES
Airway  Urgency: elective    Date/Time: 6/21/2021 2:58 PM  Airway not difficult    General Information and Staff    Patient location during procedure: OR  Anesthesiologist: Simeon Schneider MD  CRNA: Becca Rdz CRNA    Indications and Patient Condition  Indications for airway management: airway protection    Preoxygenated: yes  MILS maintained throughout  Mask difficulty assessment: 1 - vent by mask    Final Airway Details  Final airway type: endotracheal airway      Successful airway: ETT  Cuffed: yes   Successful intubation technique: direct laryngoscopy  Facilitating devices/methods: intubating stylet  Endotracheal tube insertion site: oral  Blade: Doyle  Blade size: 2  ETT size (mm): 7.0  Cormack-Lehane Classification: grade I - full view of glottis  Placement verified by: chest auscultation   Cuff volume (mL): 8  Measured from: lips  ETT/EBT  to lips (cm): 21  Number of attempts at approach: 1  Assessment: lips, teeth, and gum same as pre-op and atraumatic intubation    Additional Comments  Pt preoxygenated, SIVI, bag mask vent, ATETI, dentition as before

## 2021-06-21 NOTE — PLAN OF CARE
Goal Outcome Evaluation:              Outcome Summary: RTK today, arrived to [facility] 1835, VSS, not OOB yet, ace wrap & ice in place, DTV 0100, educated on bp monitoring, plans to d/c w/HH, CTM

## 2021-06-21 NOTE — OP NOTE
TOTAL KNEE ARTHROPLASTY  Procedure Note    Carlene Cole  6/21/2021    Pre-op Diagnosis: Osteoarthritis Right  knee primary generalized  Post-op Diagnosis:Same  Procedure: Right  Total Knee Arthroplasty  Surgeon:  Gordon Vasquez MD  Assistant: Butch VALENTIN [facility]  Anesthesia: General, Anesthesiologist: Simeon Schneider MD; Leobardo Prabhakar MD  CRNA: Becca Rdz CRNA  Staff: Circulator: Gabbi Thompson RN  Scrub Person: Nargis Fields CFA  Estimated Blood Loss:minimal  Specimens:* No orders in the log *   Drains: none  Complications: None    Components Utilized:    Implant Name Type Inv. Item Serial No.  Lot No. LRB No. Used Action   SUT CONTRL TISS STRATAFIX SPIRAL MNCRYL UD 3/0 PLS 30CM - [id number] Implant SUT CONTRL TISS STRATAFIX SPIRAL MNCRYL UD 3/0 PLS 30CM  [employer]  DIV [employer] [id number] Right 1 Implanted   SUT CONTRL TISS STRATAFIX SYMM PDS PLUS PHIL CT-1 45CM - [id number] Implant SUT CONTRL TISS STRATAFIX SYMM PDS PLUS PHIL CT-1 45CM  [employer]  DIV [employer] [id number] Right 1 Implanted   SCRW HEX PERSONA FML 2.5X25MM PK/2 - [id number] Implant SCRW HEX PERSONA FML 2.5X25MM PK/2  [employer] [id number] Right 1 Implanted   CMT BONE R 1X40 - [id number] Implant CMT BONE R 1X40  [employer] [id number] Right 1 Implanted   CMT BONE R 1X40 - [id number] Implant CMT BONE R 1X40  [employer] [id number] Right 1 Implanted   STEM TIB PERSONA CMT 5D EDE RT - [id number] Implant STEM TIB PERSONA CMT 5D EDE RT  [employer] [id number] Right 1 Implanted   COMP FEM/KN PERSONA CR CMT COCR STD SZ8 RT - [id number] Implant COMP FEM/KN PERSONA CR CMT COCR STD SZ8 RT  [employer] [id number] Right 1 Implanted   PAT NXGN POR [id number] - [id number] Implant PAT NXGN POR [id number]  [employer] [id number] Right 1 Implanted   ART/SRF KN PERSONA/VE MC EF 8TO11 10MM RT - [id number] Implant ART/SRF KN PERSONA/VE MC EF 8TO11 10MM RT  [employer] [id number] Right 1 Implanted       Indication for Procedure:   The  patient is a 65 y.o. female presents today for a total knee arthroplasty procedure because of failure to conservatively manage the patient's pain for arthritis.  The patient was educated in risks of surgery that could include possible risk of infection, deep venous thrombosis, pulmonary embolism, fracture, neurovascular injury, leg length discrepancy, dislocation, possible persistent pain, need for additional surgeries, anesthetic risks, medical risks including heart attack and stroke, and death.  The discussion occurred in the office pre-operatively, and patient had the opportunity to ask questions, and concerns about the proposed surgery.  The patient also understood that medicine is not an exact science, and that outcomes of the surgical procedure may be less than desired. The patient wished to proceed.      Protocols for intravenous antibiotics and venous thrombosis were followed for this patient.  IV antibiotics were infused prior to surgery and will be discontinued within 24 hours of completion of the surgical procedure.  Thrombosis prophylaxis will be initiated within 24 hours of the completion of the surgical procedure.      Procedure:   After the patient was identified in the preoperative area, and the surgical site confirmed and marked, the patient was brought to the operating room on a stretcher.  They were placed supine on the operating room table and the above anesthetic was placed uneventfully.  A time-out procedure was performed.  The intravenous ancef antibiotics infusion was completed.  A non-sterile tourniquet was placed on the operative thigh, and was prepped and draped in the usual sterile fashion.  An Esmarch was to exsanguinate the limb, and the tourniquet was inflated.     A 10 blade scalpel was used to make a longitudinal incision from above the patella to medial to the tibial tubercle.  A medial parapatellar arthrotomy was performed with another 10 blade scalpel.  The medial joint line was  elevated subperiosteally with electrocautery and a Palacios elevator.  The patellar fat pad was removed.  The patella was everted and osteotomy cut completed with oscillating saw.  The patella guide and drill was used to finish preparing the patella.  A patella protecting guide was placed, and the patella was everted off the side of the femur laterally.     The knee was then flexed and Sabrina's line was drawn on the distal femur with electrocautery.  Then the drill was placed into the distal femur into the medullary canal.  The intramedullary distal femoral valgus cutting guide set to 5 degrees of femoral valgus was then placed into the medullary canal.  It was then pinned and the oscillating saw was used to make the osteotomy.  Then the anterior referencing distal femoral guide was then placed and the above femoral size was measured and 3 degrees of external rotation were drilled.  The 4 in 1 femoral cutting guide was placed and pinned and the oscillating saw was used to make the appropriate cuts.     Then the extramedullary cutting guide was placed aligned with the tibial eminence superiorly and the tibial shaft distally, pinned 4 mm from the medial tibial plateau.  The oscillating saw was then used to complete the osteotomy cuts.   A laminar  was then placed medially and then laterally to remove the medial and lateral meniscus and the posterior osteophytes.  Then the tibial baseplate was placed on the tibial surface with a drop anjel to confirm an appropriate cut and size of implant.  It was then pinned externally rotated to the medial third of the tibial tubercle.  Then trial reduction was then performed with the above implants and was found to be stable in all planes.  The patella tracked centrally on the trochlear groove.    The lug holes were drilled in the distal femur and the tibia was drilled an broached in the typical fashion.  The trial components were then removed and the final implants were  confirmed and opened.  The bone surfaces were then irrigated and dried.  Palacos cement was then mixed and placed on the cut bone surfaces and back side of the implants.  The implants were then impacted into place, and the trial polyethylene spacer was placed and the knee was placed into extension.  A stack of towels was placed under the ankle and the cement was allowed to harden. The tourniquet was then dropped.  Hemostasis was obtained.  The wound was then irrigated with the pulse lavage irrigation system with a 3 liter mixture of betadine and normal saline.  The local mixture was injected throughout the knee for post-operative pain control.  The final polyethylene implant was then inserted and the knee was flexed to 90 degrees and the arthrotomy was closed with #1 Stratafix suture.  The deep dermis was closed with 2-0 Vicryl, and the skin was closed with 3-0 Monocryl suture.  Skin glue was applied and sterile dressing were applied.   Sponge and needle counts were completed and were correct.  The patient was awakened from anesthetic and was returned to recovery in stable condition.    Gordon Vasquez MD     Date: 6/21/2021  Time: 16:30 EDT

## 2021-06-21 NOTE — ANESTHESIA PREPROCEDURE EVALUATION
Anesthesia Evaluation                  Airway   Mallampati: II  TM distance: >3 FB  Neck ROM: limited  Dental    (+) upper dentures and lower dentures    Pulmonary    Cardiovascular     ECG reviewed  Rhythm: regular  Rate: normal    (+) hypertension,       Neuro/Psych  GI/Hepatic/Renal/Endo    (+) obesity,       Musculoskeletal     Abdominal    Substance History      OB/GYN          Other   arthritis,                      Anesthesia Plan    ASA 3     general   (I have reviewed the patient's history with the patient and the chart, including all pertinent laboratory results and imaging. I have explained the risks of anesthesia including but not limited to dental damage, corneal abrasion, nerve injury, MI, stroke, and death. Questions asked and answered. Anesthetic plan discussed with patient and team as indicated. Patient expressed understanding of the above.      Full upper and lower entures)  intravenous induction     Anesthetic plan, all risks, benefits, and alternatives have been provided, discussed and informed consent has been obtained with: patient.

## 2021-06-21 NOTE — H&P
ORTHOPEDIC SURGERY PRE-OP HISTORY AND PHYSICAL      Patient: Carlene Cole  Date of Admission: 6/21/2021  7:48 AM  YOB: 1955  Medical Record Number: [id number]  Attending Physician: Gordon Vasquez MD  Consulting Physician: Gordon Vasquez MD    CHIEF COMPLAINT: Right Knee Pain.    HISTORY OF PRESENT ILLINESS: Patient is a 65 y.o. female presents to [facility] with above complaints.  The patient failed conservative treatment and patient requested surgical intervention.  The patient presents for a  Right total knee.    ALLERGIES:   Allergies   Allergen Reactions   • Morphine Mental Status Change   • Codeine Palpitations   • Iodine Rash       HOME MEDICATIONS:  Medications Prior to Admission   Medication Sig Dispense Refill Last Dose   • Chlorhexidine Gluconate (HIBICLENS EX) Apply  topically. AS DIRECTED PREOP   6/21/2021 at 0630   • esterified estrogens (Menest) 1.25 MG tablet tablet Take 1.25 mg by mouth Daily.   6/20/2021 at 0800   • hydroCHLOROthiazide (HYDRODIURIL) 25 MG tablet Take 25 mg by mouth Daily.   6/20/2021 at 0800   • losartan (COZAAR) 50 MG tablet Take 50 mg by mouth Daily.   6/20/2021 at Unknown time   • mupirocin (BACTROBAN) 2 % ointment Apply  topically to the appropriate area as directed 3 (Three) Times a Day. AS DIRECTED PREOP   6/21/2021 at 0630   • tiZANidine (ZANAFLEX) 4 MG tablet Take 4 mg by mouth At Night As Needed for Muscle Spasms.   6/20/2021 at 2100   • gabapentin (NEURONTIN) 800 MG tablet Take 800 mg by mouth Every 4 (Four) Hours As Needed.   6/19/2021   • HYDROcodone-acetaminophen (NORCO)  MG per tablet Take 1 tablet by mouth Every 6 (Six) Hours As Needed for Moderate Pain .   6/19/2021 at Unknown time       Past Medical History:   Diagnosis Date   • Arthritis    • Chronic back pain    • DDD (degenerative disc disease), lumbar    • History of anemia    • Hypertension      Past Surgical History:   Procedure Laterality Date   • BLADDER  SUSPENSION     • CERVICAL FUSION     • COLONOSCOPY     • GANGLION CYST EXCISION     • HYSTERECTOMY     • LUMBAR DISC SURGERY     • LUMBAR FUSION       Social History     Occupational History   • Not on file   Tobacco Use   • Smoking status: Never Smoker   • Smokeless tobacco: Never Used   Vaping Use   • Vaping Use: Never used   Substance and Sexual Activity   • Alcohol use: Yes     Comment: SOCIAL USE   • Drug use: Yes     Types: Hydrocodone   • Sexual activity: Not on file      Social History     Social History Narrative   • Not on file     Family History   Problem Relation Age of Onset   • Malig Hyperthermia Neg Hx        REVIEW OF SYSTEMS:    HEENT: Patient denies any headaches, vision changes, change in hearing, or tinnitus, Patient denies epistaxis, sinus pain, hoarseness, or dysphagia   Pulmonary: Patient denies any cough, congestion, acute change in SOA or wheezing.   Cardiovascular: Patient denies any change in chest pain, dyspnea, palpitations, weakness, intolerance of exercise, varicosities, change in murmur   Gastrointestinal:  Patient denies change in appetite, melena, change in bowel habits.   Genital/Urinary: Patient denies dysuria, change in color of urine, change in frequency of urination, pain with urgency, change in incontinence, retention.   Musculoskeletal: Patient denies complaints of acute changes in symptoms of other joints not mentioned above.   Neurological: Patient denies changes in dizziness, tremor, ataxia, or difficulty in speaking or changes in memory.   Endocrine system: Patient denies acute changes in tremors, palpitations, polyuria, polydipsia, polyphagia, diaphoresis, exophthalmos, or goiter.   Psychological: Patient denies thoughts/plans of harming self or other; denies acute changes in depression,  insomnia, night terrors, eamon, disorientation.   Skin: Patient denies any bruising, rashes, discoloration, pruritus,or wounds not mentioned in history of present illness or chief  "complaint above.   Hematopoietic: Patient denies current bleeding, epistaxis, hematuria, or melena.    PHYSICAL EXAM:   Vitals:  Vitals:    06/21/21 0917   BP: 172/100   BP Location: Left arm   Patient Position: Lying   Pulse: 67   Resp: 18   Temp: 97.9 °F (36.6 °C)   TempSrc: Oral   SpO2: 98%   Weight: 98.4 kg (216 lb 14.9 oz)   Height: 170.2 cm (67\")       General:  65 y.o. female who appears about stated age.    Alert, cooperative, in no acute distress                       Head:    Normocephalic, without obvious abnormality, atraumatic   Eyes:            Lids and lashes normal, conjunctivae and sclerae normal, no         icterus, no pallor, corneas clear, PERRLA   Ears:    Ears appear intact with no abnormalities noted   Throat:   No oral lesions, no thrush, oral mucosa moist   Neck:   No adenopathy, supple, trachea midline, no JVD   Back:     Limited exam shows no severe kyphosis present,no visible           erythema, no excessive  tenderness to palpation.    Lungs:     Respirations regular, even and unlabored.     Heart:    Normal rate, Pulses palpable   Chest Wall:    No abnormalities observed.   Abdomen:     Normal bowel sounds, no masses, no organomegaly, soft              non-tender, non-distended, no guarding, no rebound                      tenderness   Rectal:     Deferred   Pulses:   Pulses palpable and equal bilaterally   Skin:   No bleeding, bruising or rash   Lymph nodes:   No palpable adenopathy   Extremities:     Right Knee: Skin intact.  Painful ROM.  NVI distally.      DIAGNOSTIC TEST:  No visits with results within 2 Day(s) from this visit.   Latest known visit with results is:   Lab on 06/18/2021   Component Date Value Ref Range Status   • COVID19 06/18/2021 Not Detected  Not Detected - Ref. Range Final       No results found.      ASSESSMENT:  Right Knee Osteoarthritis  Patient Active Problem List   Diagnosis   • Osteoarthritis of right knee       PLAN:    Right TKA.    Risks and benefits of " surgical intervention were discussed in detail with the patient.  Risks of infection, fracture, dislocation, extremity length discrepancy, neurovascular injury, persistent pain, medical risks, anesthetic risk, need for additional surgery, deep venous thrombosis, pulmonary embolism and death.      The above diagnosis and treatment plan was discussed with the patient and/or family.  They were educated in both non-surgical and surgical treatment options for their condition.   They were given the opportunity to ask questions and were answered to their satisfaction.  They agreed to proceed with the above treatment plan.        Gordon Vasquez MD  Date: 6/21/2021

## 2021-06-22 ENCOUNTER — HOME HEALTH ADMISSION (OUTPATIENT)
Dept: HOME HEALTH SERVICES | Facility: HOME HEALTHCARE | Age: 66
End: 2021-06-22

## 2021-06-22 VITALS
BODY MASS INDEX: 34.05 KG/M2 | SYSTOLIC BLOOD PRESSURE: 136 MMHG | RESPIRATION RATE: 18 BRPM | TEMPERATURE: 98 F | DIASTOLIC BLOOD PRESSURE: 79 MMHG | OXYGEN SATURATION: 100 % | WEIGHT: 216.93 LBS | HEART RATE: 76 BPM | HEIGHT: 67 IN

## 2021-06-22 LAB
ANION GAP SERPL CALCULATED.3IONS-SCNC: 10.2 MMOL/L (ref 5–15)
BUN SERPL-MCNC: 8 MG/DL (ref 8–23)
BUN/CREAT SERPL: 9.4 (ref 7–25)
CALCIUM SPEC-SCNC: 8.4 MG/DL (ref 8.6–10.5)
CHLORIDE SERPL-SCNC: 101 MMOL/L (ref 98–107)
CO2 SERPL-SCNC: 26.8 MMOL/L (ref 22–29)
CREAT SERPL-MCNC: 0.85 MG/DL (ref 0.57–1)
GFR SERPL CREATININE-BSD FRML MDRD: 67 ML/MIN/1.73
GFR SERPL CREATININE-BSD FRML MDRD: 81 ML/MIN/1.73
GLUCOSE SERPL-MCNC: 133 MG/DL (ref 65–99)
HCT VFR BLD AUTO: 32.7 % (ref 34–46.6)
HGB BLD-MCNC: 11 G/DL (ref 12–15.9)
POTASSIUM SERPL-SCNC: 4.1 MMOL/L (ref 3.5–5.2)
SODIUM SERPL-SCNC: 138 MMOL/L (ref 136–145)

## 2021-06-22 PROCEDURE — 63710000001 GABAPENTIN 400 MG CAPSULE: Performed by: ORTHOPAEDIC SURGERY

## 2021-06-22 PROCEDURE — 63710000001 OXYCODONE-ACETAMINOPHEN 10-325 MG TABLET: Performed by: ORTHOPAEDIC SURGERY

## 2021-06-22 PROCEDURE — A9270 NON-COVERED ITEM OR SERVICE: HCPCS | Performed by: ORTHOPAEDIC SURGERY

## 2021-06-22 PROCEDURE — 97161 PT EVAL LOW COMPLEX 20 MIN: CPT

## 2021-06-22 PROCEDURE — 97530 THERAPEUTIC ACTIVITIES: CPT

## 2021-06-22 PROCEDURE — 63710000001 OXYCODONE-ACETAMINOPHEN 5-325 MG TABLET: Performed by: ORTHOPAEDIC SURGERY

## 2021-06-22 PROCEDURE — 63710000001 ONDANSETRON PER 8 MG: Performed by: ORTHOPAEDIC SURGERY

## 2021-06-22 PROCEDURE — 85018 HEMOGLOBIN: CPT | Performed by: ORTHOPAEDIC SURGERY

## 2021-06-22 PROCEDURE — G0378 HOSPITAL OBSERVATION PER HR: HCPCS

## 2021-06-22 PROCEDURE — 25010000003 CEFAZOLIN IN DEXTROSE 2-4 GM/100ML-% SOLUTION: Performed by: ORTHOPAEDIC SURGERY

## 2021-06-22 PROCEDURE — 25010000002 HYDROMORPHONE 1 MG/ML SOLUTION: Performed by: ORTHOPAEDIC SURGERY

## 2021-06-22 PROCEDURE — 85014 HEMATOCRIT: CPT | Performed by: ORTHOPAEDIC SURGERY

## 2021-06-22 PROCEDURE — 97110 THERAPEUTIC EXERCISES: CPT

## 2021-06-22 PROCEDURE — 63710000001 ASPIRIN 81 MG TABLET DELAYED-RELEASE: Performed by: ORTHOPAEDIC SURGERY

## 2021-06-22 PROCEDURE — 63710000001 FAMOTIDINE 20 MG TABLET: Performed by: ORTHOPAEDIC SURGERY

## 2021-06-22 PROCEDURE — 80048 BASIC METABOLIC PNL TOTAL CA: CPT | Performed by: ORTHOPAEDIC SURGERY

## 2021-06-22 RX ORDER — ONDANSETRON 4 MG/1
4 TABLET, FILM COATED ORAL EVERY 6 HOURS PRN
Qty: 30 TABLET | Refills: 0 | Status: SHIPPED | OUTPATIENT
Start: 2021-06-22

## 2021-06-22 RX ADMIN — OXYCODONE AND ACETAMINOPHEN 2 TABLET: 325; 10 TABLET ORAL at 01:11

## 2021-06-22 RX ADMIN — OXYCODONE AND ACETAMINOPHEN 2 TABLET: 5; 325 TABLET ORAL at 13:27

## 2021-06-22 RX ADMIN — CEFAZOLIN SODIUM 2 G: 2 INJECTION, SOLUTION INTRAVENOUS at 05:10

## 2021-06-22 RX ADMIN — FAMOTIDINE 40 MG: 20 TABLET, FILM COATED ORAL at 08:12

## 2021-06-22 RX ADMIN — ONDANSETRON HYDROCHLORIDE 4 MG: 4 TABLET, FILM COATED ORAL at 08:12

## 2021-06-22 RX ADMIN — HYDROMORPHONE HYDROCHLORIDE 1 MG: 1 INJECTION, SOLUTION INTRAMUSCULAR; INTRAVENOUS; SUBCUTANEOUS at 03:51

## 2021-06-22 RX ADMIN — OXYCODONE AND ACETAMINOPHEN 2 TABLET: 325; 10 TABLET ORAL at 05:10

## 2021-06-22 RX ADMIN — GABAPENTIN 800 MG: 400 CAPSULE ORAL at 05:10

## 2021-06-22 RX ADMIN — ASPIRIN 81 MG: 81 TABLET, COATED ORAL at 08:12

## 2021-06-22 NOTE — DISCHARGE SUMMARY
Discharge Summary    Date of Admission: 6/21/2021  7:48 AM  Date of Discharge:  6/22/2021    Discharge Diagnosis:   Osteoarthritis of right knee [M17.11]    PMHX:   Past Medical History:   Diagnosis Date   • Arthritis    • Chronic back pain    • DDD (degenerative disc disease), lumbar    • History of anemia    • Hypertension        Discharge Disposition  Home-Health Care [facility]    Procedures Performed  Procedure(s):  RIGHT TOTAL KNEE ARTHROPLASTY       Indication for Admission  Patient is a 65 y.o. female with a long standing history of knee difficulty. The patient had failed all conservative measures pre-operatively and found to be a candidate for Total knee arthroplasty. Risks, benefits, outcomes of the procedure discussed and the patient wished to proceed. Obtained pre-operative medical clearance and found to be medically stable to undergo the above procedure. Patient tolerated the procedure well and was admitted after undergoing the above surgical procedure. They were admitted for post-operative pain control, medical management and physical therapy. The patient was started on antibiotics post-operatively per SCIP protocol and DVT prophylaxis started immediately. Patient was ambulatory on POD #0. Post-operative laboratory studies and vital signs remained stable. Patient was evaluated by Physical Therapy and found to be medically stable for discharge.    Consults:   Consults     No orders found for last 30 day(s).          Discharge Instructions:  Patient is weight bearing as tolerated on the operative leg.  Patient is to progress range of motion and ambulation as tolerated.  Use walker as needed for stability and gait.  May progress to cane as tolerated.  The dressing should be left on knee until post-operative day 7, and then removed. Then daily dry dressing changes with a non-adherent island dressing (I.e. Telfa pad). Dressing is waterproof. Patient may shower.  Use ace wrap as needed for swelling.  Patient  will follow-up in Dr. Vasquez' office in 2 weeks.  Call the office at [phone] for any questions or concerns.      Discharge Medications: Per the discharge medication reconciliation list.    Discharge Diet: Patient will continue with their regular home diet.    Activity at Discharge: Weight bearing as tolerated. Walk with the use of a walker and progress with physical therapy. Focus on aggressive range of motion.    Follow-up Appointments: Follow-up in 2 weeks in Dr. Vasquez' office.    Elvia Jeffries PA-C  06/22/21,  08:08 EDT

## 2021-06-22 NOTE — PAYOR COMM NOTE
"DISCHARGED  REF #[id number]      Carlene Cole (65 y.o. Female)     Date of Birth Social Security Number Address Home Phone MRN    1955  [address] [id number] [phone] [id number]    Anabaptist Marital Status          Shinto        Admission Date Admission Type Admitting Provider Attending Provider Department, Room/Bed    6/21/21 Elective Anderson Vasquez MD  [address], P787/1    Discharge Date Discharge Disposition Discharge Destination        6/22/2021 Home-Health Care Svc              Attending Provider: (none)   Allergies: Morphine, Codeine, Iodine    Isolation: None   Infection: None   Code Status: Not on file    Ht: 170.2 cm (67\")   Wt: 98.4 kg (216 lb 14.9 oz)    Admission Cmt: None   Principal Problem: Osteoarthritis of right knee [M17.11]                 Active Insurance as of 6/21/2021     Primary Coverage     Payor Plan Insurance Group Employer/Plan Group    [facility] MEDICARE REPLACEMENT [employer] MEDICARE REPLACEMENT      Payor Plan Address Payor Plan Phone Number Payor Plan Fax Number Effective Dates    [address] [address] [phone]  6/1/2021 - None Entered    [facility] [id number]       Subscriber Name Subscriber Birth Date Member ID       CARLENE COLE 1955 [id number]                 Emergency Contacts      (Rel.) Home Phone Work Phone Mobile Phone    INDER SCOTT (Significant Other) -- -- [phone]    JUANJO SMITH (Daughter) -- -- [phone]            Discharge Order (From admission, onward)     Start     Ordered    06/22/21 0809  Discharge patient  Once     Expected Discharge Date: 06/22/21    Expected Discharge Time: Midday    Discharge Disposition: Home-Health Care Svc    Physician of Record for Attribution - Please select from Treatment Team: ANDERSON VASQUEZ [[id number]]    Review needed by CMO to determine Physician of Record: No       Question Answer Comment   Physician of Record for Attribution - " Please select from Treatment Team ANDERSON DELCID    Review needed by CMO to determine Physician of Record No        06/22/21 0808    06/21/21 2248  Discharge patient  Once     Expected Discharge Date: 06/21/21    Discharge Disposition: Home-Health Care [facility]    Physician of Record for Attribution - Please select from Treatment Team: ANDERSON DELCID [[id number]]    Review needed by CMO to determine Physician of Record: No       Question Answer Comment   Physician of Record for Attribution - Please select from Treatment Team ANDERSON DELCID    Review needed by CMO to determine Physician of Record No        06/21/21 8539

## 2021-06-22 NOTE — CASE MANAGEMENT/SOCIAL WORK
Continued Stay Note  [facility]     Patient Name: Carlene Cole  MRN: [id number]  Today's Date: 6/22/2021    Admit Date: 6/21/2021    Discharge Plan     Row Name 06/22/21 0842       Plan    Plan  Home with family support &  [facility].    Patient/Family in Agreement with Plan  yes    Plan Comments  Spoke with the patient, verified current information and explained the role of the CCP. Patient said she has good family support. She plans to d/c home with family support & HH. She requests a referral to [facility]. Referral sent in [facility]. Patient also said she plans for her significant other/Tavo to transport her home by car at d/c. No other needs identified.        Discharge Codes    No documentation.       Expected Discharge Date and Time     Expected Discharge Date Expected Discharge Time    Jun 22, 2021             Gabriela Hernandez RN

## 2021-06-22 NOTE — PLAN OF CARE
Goal Outcome Evaluation:           Progress: no change  Outcome Summary: RTK, ace wrap intact. Patient alert and oriented x 4. Patient OOB with walker and x 1 assist. Patient urinated well throughout the night. Patient to be discharged today.

## 2021-06-22 NOTE — PAYOR COMM NOTE
"NOTIFICATION OF ADMISSION    REF#[id number]        Carlene Cole (65 y.o. Female)     Date of Birth Social Security Number Address Home Phone MRN    1955  [address] [address] [phone] [id number]    [place] Marital Status          Sabianism        Admission Date Admission Type Admitting Provider Attending Provider Department, Room/Bed    6/21/21 Elective Gordon Vasquez MD Rhoads, David, MD [address], P787/1    Discharge Date Discharge Disposition Discharge Destination         [place] [facility]              Attending Provider: Gordon Vasquez MD    Allergies: Morphine, Codeine, Iodine    Isolation: None   Infection: None   Code Status: Not on file    Ht: 170.2 cm (67\")   Wt: 98.4 kg (216 lb 14.9 oz)    Admission Cmt: None   Principal Problem: Osteoarthritis of right knee [M17.11]                 Active Insurance as of 6/21/2021     Primary Coverage     Payor Plan Insurance Group Employer/Plan Group    [facility] MEDICARE REPLACEMENT [facility] MEDICARE REPLACEMENT      Payor Plan Address Payor Plan Phone Number Payor Plan Fax Number Effective Dates    [address] [address] [phone]  6/1/2021 - None Entered    [facility] [address]       Subscriber Name Subscriber Birth Date Member ID       CARLENE COLE 1955 [id number]                 Emergency Contacts      (Rel.) Home Phone Work Phone Mobile Phone    INDER SCOTT (Significant Other) -- -- [phone]    JUANJO SMITH (Daughter) -- -- [phone]               History & Physical      Gordon Vasquez MD at 06/21/21 1131                  ORTHOPEDIC SURGERY PRE-OP HISTORY AND PHYSICAL      Patient: Carlene Cole  Date of Admission: 6/21/2021  7:48 AM  YOB: 1955  Medical Record Number: [id number]  Attending Physician: Gordon Vasquez MD  Consulting Physician: Gordon Vasquez MD    CHIEF COMPLAINT: Right Knee Pain.    HISTORY OF PRESENT ILLINESS: Patient is a " 65 y.o. female presents to [facility] with above complaints.  The patient failed conservative treatment and patient requested surgical intervention.  The patient presents for a  Right total knee.    ALLERGIES:   Allergies   Allergen Reactions   • Morphine Mental Status Change   • Codeine Palpitations   • Iodine Rash       HOME MEDICATIONS:  Medications Prior to Admission   Medication Sig Dispense Refill Last Dose   • Chlorhexidine Gluconate (HIBICLENS EX) Apply  topically. AS DIRECTED PREOP   6/21/2021 at 0630   • esterified estrogens (Menest) 1.25 MG tablet tablet Take 1.25 mg by mouth Daily.   6/20/2021 at 0800   • hydroCHLOROthiazide (HYDRODIURIL) 25 MG tablet Take 25 mg by mouth Daily.   6/20/2021 at 0800   • losartan (COZAAR) 50 MG tablet Take 50 mg by mouth Daily.   6/20/2021 at Unknown time   • mupirocin (BACTROBAN) 2 % ointment Apply  topically to the appropriate area as directed 3 (Three) Times a Day. AS DIRECTED PREOP   6/21/2021 at 0630   • tiZANidine (ZANAFLEX) 4 MG tablet Take 4 mg by mouth At Night As Needed for Muscle Spasms.   6/20/2021 at 2100   • gabapentin (NEURONTIN) 800 MG tablet Take 800 mg by mouth Every 4 (Four) Hours As Needed.   6/19/2021   • HYDROcodone-acetaminophen (NORCO)  MG per tablet Take 1 tablet by mouth Every 6 (Six) Hours As Needed for Moderate Pain .   6/19/2021 at Unknown time       Past Medical History:   Diagnosis Date   • Arthritis    • Chronic back pain    • DDD (degenerative disc disease), lumbar    • History of anemia    • Hypertension      Past Surgical History:   Procedure Laterality Date   • BLADDER SUSPENSION     • CERVICAL FUSION     • COLONOSCOPY     • GANGLION CYST EXCISION     • HYSTERECTOMY     • LUMBAR DISC SURGERY     • LUMBAR FUSION       Social History     Occupational History   • Not on file   Tobacco Use   • Smoking status: Never Smoker   • Smokeless tobacco: Never Used   Vaping Use   • Vaping Use: Never used   Substance and Sexual  "Activity   • Alcohol use: Yes     Comment: SOCIAL USE   • Drug use: Yes     Types: Hydrocodone   • Sexual activity: Not on file      Social History     Social History Narrative   • Not on file     Family History   Problem Relation Age of Onset   • Malig Hyperthermia Neg Hx        REVIEW OF SYSTEMS:    HEENT: Patient denies any headaches, vision changes, change in hearing, or tinnitus, Patient denies epistaxis, sinus pain, hoarseness, or dysphagia   Pulmonary: Patient denies any cough, congestion, acute change in SOA or wheezing.   Cardiovascular: Patient denies any change in chest pain, dyspnea, palpitations, weakness, intolerance of exercise, varicosities, change in murmur   Gastrointestinal:  Patient denies change in appetite, melena, change in bowel habits.   Genital/Urinary: Patient denies dysuria, change in color of urine, change in frequency of urination, pain with urgency, change in incontinence, retention.   Musculoskeletal: Patient denies complaints of acute changes in symptoms of other joints not mentioned above.   Neurological: Patient denies changes in dizziness, tremor, ataxia, or difficulty in speaking or changes in memory.   Endocrine system: Patient denies acute changes in tremors, palpitations, polyuria, polydipsia, polyphagia, diaphoresis, exophthalmos, or goiter.   Psychological: Patient denies thoughts/plans of harming self or other; denies acute changes in depression,  insomnia, night terrors, eamon, disorientation.   Skin: Patient denies any bruising, rashes, discoloration, pruritus,or wounds not mentioned in history of present illness or chief complaint above.   Hematopoietic: Patient denies current bleeding, epistaxis, hematuria, or melena.    PHYSICAL EXAM:   Vitals:  Vitals:    06/21/21 0917   BP: 172/100   BP Location: Left arm   Patient Position: Lying   Pulse: 67   Resp: 18   Temp: 97.9 °F (36.6 °C)   TempSrc: Oral   SpO2: 98%   Weight: 98.4 kg (216 lb 14.9 oz)   Height: 170.2 cm (67\") "       General:  65 y.o. female who appears about stated age.    Alert, cooperative, in no acute distress                       Head:    Normocephalic, without obvious abnormality, atraumatic   Eyes:            Lids and lashes normal, conjunctivae and sclerae normal, no         icterus, no pallor, corneas clear, PERRLA   Ears:    Ears appear intact with no abnormalities noted   Throat:   No oral lesions, no thrush, oral mucosa moist   Neck:   No adenopathy, supple, trachea midline, no JVD   Back:     Limited exam shows no severe kyphosis present,no visible           erythema, no excessive  tenderness to palpation.    Lungs:     Respirations regular, even and unlabored.     Heart:    Normal rate, Pulses palpable   Chest Wall:    No abnormalities observed.   Abdomen:     Normal bowel sounds, no masses, no organomegaly, soft              non-tender, non-distended, no guarding, no rebound                      tenderness   Rectal:     Deferred   Pulses:   Pulses palpable and equal bilaterally   Skin:   No bleeding, bruising or rash   Lymph nodes:   No palpable adenopathy   Extremities:     Right Knee: Skin intact.  Painful ROM.  NVI distally.      DIAGNOSTIC TEST:  No visits with results within 2 Day(s) from this visit.   Latest known visit with results is:   Lab on 06/18/2021   Component Date Value Ref Range Status   • COVID19 06/18/2021 Not Detected  Not Detected - Ref. Range Final       No results found.      ASSESSMENT:  Right Knee Osteoarthritis  Patient Active Problem List   Diagnosis   • Osteoarthritis of right knee       PLAN:    Right TKA.    Risks and benefits of surgical intervention were discussed in detail with the patient.  Risks of infection, fracture, dislocation, extremity length discrepancy, neurovascular injury, persistent pain, medical risks, anesthetic risk, need for additional surgery, deep venous thrombosis, pulmonary embolism and death.      The above diagnosis and treatment plan was discussed with  the patient and/or family.  They were educated in both non-surgical and surgical treatment options for their condition.   They were given the opportunity to ask questions and were answered to their satisfaction.  They agreed to proceed with the above treatment plan.        Gordon Vasquez MD  Date: 6/21/2021        Electronically signed by Gordon Vasquez MD at 06/21/21 1131     H&P signed by New Onbase, [employer] at 06/16/21 1032      Scan on 6/21/2021 by [employer] On[employer], [employer]: H&amp;P, P&amp;R ORTHO, 06/04/2021          Electronically signed by [employer] Onbase, [employer] at 06/16/21 1032

## 2021-06-22 NOTE — THERAPY EVALUATION
Patient Name: Carlene Cole  : 1955    MRN: [id number]                              Today's Date: 2021       Admit Date: 2021    Visit Dx:     ICD-10-CM ICD-9-CM   1. Primary osteoarthritis of right knee  M17.11 715.16     Patient Active Problem List   Diagnosis   • Osteoarthritis of right knee     Past Medical History:   Diagnosis Date   • Arthritis    • Chronic back pain    • DDD (degenerative disc disease), lumbar    • History of anemia    • Hypertension      Past Surgical History:   Procedure Laterality Date   • BLADDER SUSPENSION     • CERVICAL FUSION     • COLONOSCOPY     • GANGLION CYST EXCISION     • HYSTERECTOMY     • LUMBAR DISC SURGERY     • LUMBAR FUSION       General Information     [facility] Name 21 0857          Physical Therapy Time and Intention    Document Type  evaluation  -     Mode of Treatment  individual therapy;physical therapy  -     Row Name 21 0857          General Information    Patient Profile Reviewed  yes  -     Prior Level of Function  independent:;gait;transfer;bed mobility  -     Existing Precautions/Restrictions  fall  -     Row Name 21 0857          Living Environment    Lives With  spouse  -     Row Name 21 0857          Home Main Entrance    Number of Stairs, Main Entrance  five  -     Stair Railings, Main Entrance  railings on both sides of stairs  -     Row Name 21 0857          Stairs Within Home, Primary    Number of Stairs, Within Home, Primary  other (see comments) 14  -     Stair Railings, Within Home, Primary  railing on right side (ascending)  -     Row Name 21 0857          Cognition    Orientation Status (Cognition)  oriented x 3  -     Row Name 21 0857          Safety Issues, Functional Mobility    Impairments Affecting Function (Mobility)  balance;pain;range of motion (ROM);strength;endurance/activity tolerance  -       User Key  (r) = Recorded By, (t) = Taken By, (c) = Cosigned By     Initials Name Provider Type     Adriana Santiago Physical Therapist        Mobility     Row Name 06/22/21 0919          Bed Mobility    Bed Mobility  supine-sit;sit-supine  -     Supine-Sit [place] (Bed Mobility)  independent  -     Sit-Supine [place] (Bed Mobility)  not tested [facility]  -     Assistive Device (Bed Mobility)  head of bed elevated  -     Row Name 06/22/21 0919          Sit-Stand Transfer    Sit-Stand [place] (Transfers)  independent  -     Assistive Device (Sit-Stand Transfers)  walker, front-wheeled  -     Row Name 06/22/21 0919          Gait/Stairs (Locomotion)    [place] Level (Gait)  modified independence  -     Assistive Device (Gait)  walker, front-wheeled  -     Distance in Feet (Gait)  120ft  -     Deviations/Abnormal Patterns (Gait)  gait speed decreased;stride length decreased;weight shifting decreased;antalgic  -     Bilateral Gait Deviations  heel strike decreased;forward flexed posture  -     [place] Level (Stairs)  contact guard  -     Handrail Location (Stairs)  right side (ascending);left side (descending)  -     Number of Steps (Stairs)  4  -     Ascending Technique (Stairs)  step-to-step  -     Descending Technique (Stairs)  step-to-step  -     Comment (Gait/Stairs)  Pt tolerated ambulation well with FWW, demonstrating overall slow gait speed and decreased heel strike. Pt noted mild dizziness which improved with seated rest. Completed 4 steps with VCs for sequencing and safety at home.  -     Row Name 06/22/21 0919          Mobility    Extremity Weight-bearing Status  right lower extremity  -     Right Lower Extremity (Weight-bearing Status)  weight-bearing as tolerated (WBAT)  -       User Key  (r) = Recorded By, (t) = Taken By, (c) = Cosigned By    Initials Name Provider Type     Adriana Santiago Physical Therapist        Obj/Interventions     Row Name 06/22/21 0921          Range of Motion Comprehensive    General Range of  Motion  lower extremity range of motion deficits identified  -     Comment, General Range of Motion  Expected RLE post-op ROM deficits  -BH     Row Name 06/22/21 0921          Strength Comprehensive (MMT)    General Manual Muscle Testing (MMT) Assessment  lower extremity strength deficits identified  -     Comment, General Manual Muscle Testing (MMT) Assessment  Expected post-op RLE weakness, grossly 4/5  -BH     Row Name 06/22/21 0921          Motor Skills    Therapeutic Exercise  -- 10 reps TKA protocol  -BH     Row Name 06/22/21 0921          Balance    Balance Assessment  sitting static balance;standing static balance  -     Static Sitting Balance  WFL;unsupported;sitting in chair;sitting, edge of bed  -     Static Standing Balance  WFL;unsupported;standing  -BH     Row Name 06/22/21 0921          Sensory Assessment (Somatosensory)    Sensory Assessment (Somatosensory)  LE sensation intact  -       User Key  (r) = Recorded By, (t) = Taken By, (c) = Cosigned By    Initials Name Provider Type     Adriana Santiago Physical Therapist        Goals/Plan    No documentation.       Clinical Impression     Row Name 06/22/21 0858          Pain    Additional Documentation  Pain Scale: Numbers Pre/Post-Treatment (Group)  -BH     Row Name 06/22/21 0858          Pain Scale: Numbers Pre/Post-Treatment    Pretreatment Pain Rating  5/10  -     Posttreatment Pain Rating  5/10  -     Pain Location - Side  Right  -     Pain Location  knee  -     Pain Intervention(s)  Medication (See MAR);Cold applied;Repositioned;Ambulation/increased activity  -BH     Row Name 06/22/21 0872          Plan of Care Review    Plan of Care Reviewed With  patient;significant other  -     Outcome Summary  Pt is a 66 yo F POD 1 R TKA. Pt lives with her significant other, who is able to help her if needed. Pt has 5 KALEIGH with B HRs, and 14 steps inside with R HR. Pt presents to PT with expected post-op ROM deficits and weakness. Pt  completed bed mobility and STS with independence. Pt ambulated 120ft with FWW, demonstrating slow gait speed and decreased heel strike, but overall good safety and independence. Pt  completed 4 steps with CGA, requiring VCs for sequencing. Pt will continue to benefit from skilled PT to improve strength and endurance in order to progress gait and improve overall functional independence. PT recommending home with HHPT at D/C.  -     Row Name 06/22/21 0858          Therapy Assessment/Plan (PT)    Patient/Family Therapy Goals Statement (PT)  Return to OF  -     Rehab Potential (PT)  good, to achieve stated therapy goals  -     Criteria for Skilled Interventions Met (PT)  yes  -     Row Name 06/22/21 0858          Positioning and Restraints    Pre-Treatment Position  in bed  -     Post Treatment Position  chair  -     In Chair  reclined;call light within reach;encouraged to call for assist;exit alarm on;with family/caregiver  -       User Key  (r) = Recorded By, (t) = Taken By, (c) = Cosigned By    Initials Name Provider Type    Adriana Kaur Physical Therapist        Outcome Measures     Row Name 06/22/21 0932          How much help from another person do you currently need...    Turning from your back to your side while in flat bed without using bedrails?  4  -BH     Moving from lying on back to sitting on the side of a flat bed without bedrails?  4  -BH     Moving to and from a bed to a chair (including a wheelchair)?  4  -BH     Standing up from a chair using your arms (e.g., wheelchair, bedside chair)?  4  -BH     Climbing 3-5 steps with a railing?  3  -BH     To walk in hospital room?  4  -     AM-PAC 6 Clicks Score (PT)  23  -     Row Name 06/22/21 0932          Functional Assessment    Outcome Measure Options  AM-PAC 6 Clicks Basic Mobility (PT)  -       User Key  (r) = Recorded By, (t) = Taken By, (c) = Cosigned By    Initials Name Provider Type    Adriana Kaur Physical Therapist         Physical Therapy Education                 Title: PT OT SLP Therapies (Done)     Topic: Physical Therapy (Done)     Point: Mobility training (Done)     Learning Progress Summary           Patient Acceptance, E,TB,D, VU,NR by  at 6/22/2021 0932                   Point: Home exercise program (Done)     Learning Progress Summary           Patient Acceptance, E,TB,D, VU,NR by  at 6/22/2021 0932                   Point: Body mechanics (Done)     Learning Progress Summary           Patient Acceptance, E,TB,D, VU,NR by  at 6/22/2021 0932                   Point: Precautions (Done)     Learning Progress Summary           Patient Acceptance, E,TB,D, VU,NR by  at 6/22/2021 0932                               User Key     Initials Effective Dates Name Provider Type Discipline     05/10/21 -  Adriana Santiago Physical Therapist PT              PT Recommendation and Plan     Plan of Care Reviewed With: patient, significant other  Outcome Summary: Pt is a 66 yo F POD 1 R TKA. Pt lives with her significant other, who is able to help her if needed. Pt has 5 KALEIGH with B HRs, and 14 steps inside with R HR. Pt presents to PT with expected post-op ROM deficits and weakness. Pt completed bed mobility and STS with independence. Pt ambulated 120ft with FWW, demonstrating slow gait speed and decreased heel strike, but overall good safety and independence. Pt  completed 4 steps with CGA, requiring VCs for sequencing. Pt will continue to benefit from skilled PT to improve strength and endurance in order to progress gait and improve overall functional independence. PT recommending home with HHPT at D/C.     Time Calculation:   PT Charges     Row Name 06/22/21 0933             Time Calculation    Start Time  0855  -      Stop Time  0933  -      Time Calculation (min)  38 min  -      PT Received On  06/22/21  -      PT - Next Appointment  06/23/21  [facility]      PT Goal Re-Cert Due Date  06/29/21  -         Time Calculation-  PT    Total Timed Code Minutes- PT  33 minute(s)  -         Timed Charges    97623 - PT Therapeutic Exercise Minutes  10  -      93987 - Gait Training Minutes   10  -      62773 - PT Therapeutic Activity Minutes  13  -         Untimed Charges    PT Eval/Re-eval Minutes  5  -         Total Minutes    Timed Charges Total Minutes  33  -      Untimed Charges Total Minutes  5  -BH       Total Minutes  38  -BH        User Key  (r) = Recorded By, (t) = Taken By, (c) = Cosigned By    Initials Name Provider Type     Adriana Santiago Physical Therapist        Therapy Charges for Today     Code Description Service Date Service Provider Modifiers Qty    49642878318 HC PT THER PROC EA 15 MIN 6/22/2021 Adriana Santiago GP 1    77392703745 HC PT THERAPEUTIC ACT EA 15 MIN 6/22/2021 Adriana Santiago GP 1    72743445280 HC PT EVAL LOW COMPLEXITY 2 6/22/2021 Adriana Santiago GP 1          PT G-Codes  Outcome Measure Options: AM-PAC 6 Clicks Basic Mobility (PT)  AM-PAC 6 Clicks Score (PT): 23    ADRIANA SANTIAGO  6/22/2021

## 2021-06-22 NOTE — PLAN OF CARE
Goal Outcome Evaluation:  Plan of Care Reviewed With: patient, significant other           Outcome Summary: Pt is a 66 yo F POD 1 R TKA. Pt lives with her significant other, who is able to help her if needed. Pt has 5 KALEIGH with B HRs, and 14 steps inside with R HR. Pt presents to PT with expected post-op ROM deficits and weakness. Pt completed bed mobility and STS with independence. Pt ambulated 120ft with FWW, demonstrating slow gait speed and decreased heel strike, but overall good safety and independence. Pt  completed 4 steps with CGA, requiring VCs for sequencing. Pt will continue to benefit from skilled PT to improve strength and endurance in order to progress gait and improve overall functional independence. PT recommending home with HHPT at D/C.

## 2021-06-22 NOTE — DISCHARGE PLACEMENT REQUEST
"Carlene Cole (65 y.o. Female)     Date of Birth Social Security Number Address Home Phone MRN    1955  [address] [address] [phone] [id number]    [employer] Mar[employer] Status          [employer]        Admission Date Admission Type Admitting Provider Attending Provider Department, Room/Bed    6/21/21 Elective Gordon Vasquez MD Rhoads, David, MD [address], P787/1    Discharge Date Discharge Disposition Discharge Destination         Home[place] [facility] [place]              Attending Provider: Gordon Vasquez MD    Allergies: Morphine, Codeine, Iodine    Isolation: None   Infection: None   Code Status: Not on file    Ht: 170.2 cm (67\")   Wt: 98.4 kg (216 lb 14.9 oz)    Admission Cmt: None   Principal Problem: Osteoarthritis of right knee [M17.11]                 Active Insurance as of 6/21/2021     Primary Coverage     Payor Plan Insurance Group Employer/Plan Group    [facility]      Payor Plan Address Payor Plan Phone Number Payor Plan Fax Number Effective Dates    [address] [address] [phone]  6/1/2021 - None Entered    [facility] [address]       Subscriber Name Subscriber Birth Date Member ID       CARLENE COLE 1955 [id number]                 Emergency Contacts      (Rel.) Home Phone Work Phone Mobile Phone    INDER SCOTT (Significant Other) -- -- [phone]    JUANJO SMITH (Daughter) -- -- [phone]              "

## 2021-06-22 NOTE — ANESTHESIA POSTPROCEDURE EVALUATION
Patient: Carlene Cole    Procedure Summary     Date: 06/21/21 Room / Location: [facility] OR [address] [place] [address] MAIN OR    Anesthesia Start: 1451 Anesthesia Stop: 1713    Procedure: RIGHT TOTAL KNEE ARTHROPLASTY (Right Knee) Diagnosis:     Surgeons: Gordon Vasquez MD Provider: Leobardo Prabhakar MD    Anesthesia Type: general ASA Status: 3          Anesthesia Type: general    Vitals  Vitals Value Taken Time   /97 06/21/21 1815   Temp 36.7 °C (98 °F) 06/21/21 1712   Pulse 71 06/21/21 1827   Resp 14 06/21/21 1800   SpO2 98 % 06/21/21 1827   Vitals shown include unvalidated device data.        Anesthesia Post Evaluation

## 2021-06-22 NOTE — DISCHARGE PLACEMENT REQUEST
"Carlene Cole (65 y.o. Female)     Date of Birth Social Security Number Address Home Phone MRN    1955  [address] [address] [phone] [id number]    Mandaeism Marital Status          [employer]eh[employer]        Admission Date Admission Type Admitting Provider Attending Provider Department, Room/Bed    6/21/21 Elective Gordon Vasquez MD Rhoads, David, MD [address], P787/1    Discharge Date Discharge Disposition Discharge Destination         Home-Health Care [facility] [place]              Attending Provider: Gordon Vasquez MD    Allergies: Morphine, Codeine, Iodine    Isolation: None   Infection: None   Code Status: Not on file    Ht: 170.2 cm (67\")   Wt: 98.4 kg (216 lb 14.9 oz)    Admission Cmt: None   Principal Problem: Osteoarthritis of right knee [M17.11]                 Active Insurance as of 6/21/2021     Primary Coverage     Payor Plan Insurance Group Employer/Plan Group    [facility]      Payor Plan Address Payor Plan Phone Number Payor Plan Fax Number Effective Dates    [address] [address] [phone]  6/1/2021 - None Entered    [facility] [address]       Subscriber Name Subscriber Birth Date Member ID       CARLENE COLE 1955 [id number]                 Emergency Contacts      (Rel.) Home Phone Work Phone Mobile Phone    INDER SCOTT (Significant Other) -- -- [phone]    JUANJO SMITH (Daughter) -- -- [phone]          "

## 2021-06-22 NOTE — PROGRESS NOTES
[facility] [place] to follow for HH PT joint protocol per Dr Vasquez.  Per patient, call mobile number to schedule visits - [phone].  Ariadne Esparza RN

## 2021-06-22 NOTE — PROGRESS NOTES
Orthopedic Progress Note    Subjective :   Patient seen and examined. Resting comfortably. No issues overnight. Pain controlled.  Patient states mild nausea.  Patient has not been evaluated from physical therapy.  Objective :    Vital signs in last 24 hours:  Temp:  [96.7 °F (35.9 °C)-98 °F (36.7 °C)] 97 °F (36.1 °C)  Heart Rate:  [65-90] 89  Resp:  [14-18] 16  BP: (129-192)/() 129/82  Vitals:    06/21/21 1800 06/21/21 1931 06/21/21 2307 06/22/21 0324   BP: 156/100 144/88 130/88 129/82   BP Location:  Left arm Left arm Left arm   Patient Position:  Lying Lying Lying   Pulse: 78 84 90 89   Resp: 14 16 16 16   Temp:  96.7 °F (35.9 °C) 96.9 °F (36.1 °C) 97 °F (36.1 °C)   TempSrc:  Oral Oral Oral   SpO2: 96% 99% 100% 99%   Weight:       Height:           PHYSICAL EXAM:  Patient is calm, in no acute distress, awake and oriented x 3.  Dressing clean, dry and intact.  No signs of infection.  Swelling is appropriate.  Ecchymosis is appropriate in amount.  Homans test is negative.  Patient is neurovascularly intact distally.  EHL,FHL,TA,GS are intact  DP/TP 2+    LABS:  Results from last 7 days   Lab Units 06/22/21  0621   HEMOGLOBIN g/dL 11.0*   HEMATOCRIT % 32.7*     Results from last 7 days   Lab Units 06/22/21  0621   SODIUM mmol/L 138   POTASSIUM mmol/L 4.1   CHLORIDE mmol/L 101   CO2 mmol/L 26.8   BUN mg/dL 8   CREATININE mg/dL 0.85   GLUCOSE mg/dL 133*   CALCIUM mg/dL 8.4*           RADIOLOGY:  FINDINGS: 2 portable postoperative views of the right knee are submitted  for interpretation an portable including AP and crosstable lateral  views. There has been performance of a total right knee arthroplasty  with good alignment of femoral and tibial components of the right knee  prosthesis. There has been posterior patellar resurfacing. No  superimposed acute fracture or malalignment is identified in bones of  the right knee. There is postoperative air in the soft tissues anterior  to the knee and  postoperative fluid and air in the right knee joint.     IMPRESSION:  1. Status post total right knee arthroplasty with good alignment of the  hardware and no complicating features are seen.     This report was finalized on 6/22/2021 7:21 AM by Dr. Rhett Streeter M.D.  ASSESSMENT:  Status post right Total knee arthroplasty, POD# 01    Plan:  Physical Therapy to evaluate, WBAT. ROM as tolerated with Physical therapy  Continue SCDs, aspirin 81mg one tab PO BID x30 days for DVT prophylaxis.  Dispo planning for home with home health when medically stable.  Follow-up in ' office in 2 weeks.    Elvia Jeffries PA-C    Date: 6/22/2021  Time: 08:06 EDT

## 2021-06-23 ENCOUNTER — HOME CARE VISIT (OUTPATIENT)
Dept: HOME HEALTH SERVICES | Facility: HOME HEALTHCARE | Age: 66
End: 2021-06-23

## 2021-06-23 PROCEDURE — G0151 HHCP-SERV OF PT,EA 15 MIN: HCPCS

## 2021-06-23 NOTE — HOME HEALTH
"  PHYSICAL THERAPY KNEE ARTHROPLASTY EVALUATION    REASON FOR REFERRAL: 65  year old female admitted to [facility] on 6/21/2021  for elective Right total knee arthroplasty. She was discharged home on 6/22/2021 & referred for home health PT services for surgical aftercare of Right TKA, resulting in decreased range of motion and strength in Right knee, impeding functional mobility and gait activities, which prevents patient from safely exiting home for medical appointments.    Pt reports her knee is very stiff & painful.  She has decided that she is going to sleep on the first floor & use a BSC, as the steps are too difficult for her.  Pt states she thinks she's going to be \"on her own\" with her TKA recovery.    DIAGNOSIS:   AFTERCARE (R) TOTAL KNEE ARTHROPLASTY FOR ENDSTAGE ARTHRITIS RIGHT KNEE, GAIT ABNORMALITY    SURGICAL PROCEDURE:   RIGHT TOTAL KNEE ARTHROPLASTY BY DR. DELCID    PAST MEDICAL HISTORY:  Arthritis   Chronic back pain   DDD (degenerative disc disease), lumbar   History of anemia   Hypertension     PAST SURGICAL HISTORY:  BLADDER SUSPENSION   CERVICAL FUSION   GANGLION CYST EXCISION   HYSTERECTOMY   LUMBAR DISC SURGERY   LUMBAR FUSION     PRIOR LEVEL OF FUNCTION: (I) ambulation without assistive device with antalgic gait.  Pt is employed at [employer].    ENVIRONMENT/HOME SITUATION:  Lives in 2 story home with significant other & 4 dogs (1 large & 3 small).  5 steps with (B) rails to enter home.  Pt has 2 steps no rail followed by 12 steps with single rail to reach upstairs bedroom & bathroom.  No bathroom on 1st level.      VITAL SIGNS    BLOOD PRESSURE:   122/68   TEMPERATURE:  97.3   PULSE:  78   RESPIRATIONS:  16   OXYGEN SATURATIONS:  97% Room Air  OXYGEN USE: No    CLINICAL FINDINGS  MENTAL STATUS: Alert and oriented x 4    PAIN RATING:         LOCATION: Right Knee - Continuous        AT BEST:    6/10        AT WORST:  10/10        AT PRESENT:   6/10        INCREASES WITH:  End range of motion knee " flexion, Active knee extension        CONTROLLED WITH:  Narcotics, ice, rest, elevation    EDEMA: Moderate Right knee. Calf is soft & mildly tender due to bruising.  Mimi's negative    WOUND / SKIN CONDITION:  scant drainage on optifoam dressing covering Right anterior knee incision.  Bruising noted along medial calf & lateral knee.     SIGNS SYMPTOMS OF INFECTION:   None    POSTURE:  No gross deformities    MUSCLE TONE/COORDINATION:   WNL    STRENGTH DEFICITS:  (R) Knee:  3-/5.  Pt initially required min assist with SAQ's, however eventually able to perform (I).  She is unable to perform SLR.    RIGHT KNEE ROM DEFICITS IN SITTING:   -30* to 95*.  Patient is able to obtain  -20* extension in supine.    TIMED UP AND GO score:  71  seconds- indicates  DIMINISHED  MOBILITY  (TUG Mobility: High > 10 sec/Typical 10-19/Slower 20-29/Diminished>30)    INSTRUCTIONS & INTERVENTIONS PERFORMED THIS DATE: (Patient provided with detailed instruction sheet as well as written/pictoral HEP)  1. Pain/edema management - Instructed patient to take pain medication as prescribed, ice & elevate knee throughout the day, with care to place barrier between ice pack and skin. PT demonstrated proper elevation of Right LE, with care not to place pillow under knee allowing knee to flex.  The (L) LE is to be flexed over pillow for lumbar support. Pt verbalized understanding.  2. Therapeutic exercise - Instructed patient in Right lower extremity strengthening and range of motion exercises provided in written/pictoral HEP.  Patient performed the following exercises x 10 reps:  SUPINE: Ankle pumps- pt instructed to perform 10-20 reps every hour, quad sets- object placed under knee to improve contraction, short arc quads- pt initially required min assist, however was able to perform the last 3 reps (I), straight leg raises with max assist x 5, passive extension stretch to calf/knee/hamstring in supine with use of gt belt & heel slides with use of  gait belt for assistance.   SITTING:  Long arc quads with support under distal thigh at knee, Knee flexion with pt sliding heel on floor. Patient instructed to perform exercises 2 times daily  3. Gait training with rolling walker.  Pt instructed in progression toward reciprocal, heel-toe gt pattern with increase in right knee ROM.  Pt to establish a walking path & ambulate every 1-2 hours.  4. Instruction in medications & bleeding precautions  5. Established outpatient therapy appointment at [facility] the [facility] (pt's choice) to begin on 7/6/2021.  6. Established PT plan of care with pt who is in agreement.

## 2021-06-23 NOTE — CASE MANAGEMENT/SOCIAL WORK
Case Management Discharge Note      Final Note: Home with family/friend support & [facility].         Selected Continued Care - Discharged on 6/22/2021 Admission date: 6/21/2021 - Discharge disposition: Home-Health Care Svc    Destination    No services have been selected for the patient.              Durable Medical Equipment    No services have been selected for the patient.              Dialysis/Infusion    No services have been selected for the patient.              Home Medical Care Coordination complete    Service Provider Selected Services Address Phone Fax Patient Preferred     [facility]  [facility] [address] [address] [phone] --          Therapy    No services have been selected for the patient.              Community Resources    No services have been selected for the patient.              Community & DME    No services have been selected for the patient.                       Final Discharge Disposition Code: 06 - home with home health care

## 2021-06-25 ENCOUNTER — HOME CARE VISIT (OUTPATIENT)
Dept: HOME HEALTH SERVICES | Facility: HOME HEALTHCARE | Age: 66
End: 2021-06-25

## 2021-06-25 PROCEDURE — G0151 HHCP-SERV OF PT,EA 15 MIN: HCPCS

## 2021-06-28 ENCOUNTER — HOME CARE VISIT (OUTPATIENT)
Dept: HOME HEALTH SERVICES | Facility: HOME HEALTHCARE | Age: 66
End: 2021-06-28

## 2021-06-28 VITALS
OXYGEN SATURATION: 97 % | HEART RATE: 83 BPM | TEMPERATURE: 98.7 F | SYSTOLIC BLOOD PRESSURE: 154 MMHG | DIASTOLIC BLOOD PRESSURE: 90 MMHG

## 2021-06-28 PROCEDURE — G0151 HHCP-SERV OF PT,EA 15 MIN: HCPCS

## 2021-06-28 NOTE — HOME HEALTH
S:  Patient states she has been going up the steps at least once a day.  Her significant other states she is overdoing it.    Dressing intact and no drainage noted.  Bruising has increased around the bandage, but no heat or other s/s of infection.

## 2021-06-29 VITALS
OXYGEN SATURATION: 99 % | HEART RATE: 72 BPM | DIASTOLIC BLOOD PRESSURE: 90 MMHG | TEMPERATURE: 98.4 F | SYSTOLIC BLOOD PRESSURE: 148 MMHG

## 2021-06-29 NOTE — HOME HEALTH
She has not been taking her HCTZ pill and after discussing her edema and her elevated BP, she is going to start taking it.   Ace wrap applied to her right LE for better edema control.  Dressing still intact and scant drainage still.

## 2021-06-30 ENCOUNTER — HOME CARE VISIT (OUTPATIENT)
Dept: HOME HEALTH SERVICES | Facility: HOME HEALTHCARE | Age: 66
End: 2021-06-30

## 2021-06-30 PROCEDURE — G0151 HHCP-SERV OF PT,EA 15 MIN: HCPCS

## 2021-07-01 ENCOUNTER — HOME CARE VISIT (OUTPATIENT)
Dept: HOME HEALTH SERVICES | Facility: HOME HEALTHCARE | Age: 66
End: 2021-07-01

## 2021-07-01 VITALS
HEART RATE: 74 BPM | DIASTOLIC BLOOD PRESSURE: 80 MMHG | TEMPERATURE: 98.6 F | OXYGEN SATURATION: 95 % | SYSTOLIC BLOOD PRESSURE: 124 MMHG

## 2021-07-01 PROCEDURE — G0151 HHCP-SERV OF PT,EA 15 MIN: HCPCS

## 2021-07-01 NOTE — HOME HEALTH
Patient confirmed outpt starts on 7/6.  Her surgical follow-up on Friday 7/2.  She is still struggling with pain management.  She went out the other day and had some difficulty with the steps, but after education today she felt more comfortable with them.

## 2021-07-02 VITALS
TEMPERATURE: 98.7 F | OXYGEN SATURATION: 98 % | SYSTOLIC BLOOD PRESSURE: 116 MMHG | RESPIRATION RATE: 18 BRPM | HEART RATE: 77 BPM | DIASTOLIC BLOOD PRESSURE: 76 MMHG

## 2021-07-02 NOTE — HOME HEALTH
"Patient upset that her pain pills are out and she is having a hard time getting them refilled.  She has called Dr. Sultana's office several times to try to get it resolved.  She was on pain pills prior to the surgery for her chronic back pain. She has gabapentin that she is going to start back and I advise that she at least take some Tylenol for her pain.     She complained that her knee felt \"tight\" and she is guarding it.  Instruction on the importance of staying on at least a small routine of the HEP so her knee does not get worse.      Despite her pain she is still able to get around and should be able to get to outpt therapy.  She has a follow-up MD appt tomorrow."

## 2021-09-17 ENCOUNTER — LAB (OUTPATIENT)
Dept: LAB | Facility: HOSPITAL | Age: 66
End: 2021-09-17

## 2021-09-17 DIAGNOSIS — Z01.818 OTHER SPECIFIED PRE-OPERATIVE EXAMINATION: Primary | ICD-10-CM

## 2021-09-17 LAB — SARS-COV-2 ORF1AB RESP QL NAA+PROBE: NOT DETECTED

## 2021-09-17 PROCEDURE — C9803 HOPD COVID-19 SPEC COLLECT: HCPCS

## 2021-09-17 PROCEDURE — U0004 COV-19 TEST NON-CDC HGH THRU: HCPCS

## 2021-09-20 ENCOUNTER — ANESTHESIA (OUTPATIENT)
Dept: PERIOP | Facility: HOSPITAL | Age: 66
End: 2021-09-20

## 2021-09-20 ENCOUNTER — HOSPITAL ENCOUNTER (OUTPATIENT)
Facility: HOSPITAL | Age: 66
Setting detail: HOSPITAL OUTPATIENT SURGERY
Discharge: HOME | End: 2021-09-20
Attending: ORTHOPAEDIC SURGERY | Admitting: ORTHOPAEDIC SURGERY

## 2021-09-20 ENCOUNTER — ANESTHESIA EVENT (OUTPATIENT)
Dept: PERIOP | Facility: HOSPITAL | Age: 66
End: 2021-09-20

## 2021-09-20 VITALS
WEIGHT: 199.74 LBS | OXYGEN SATURATION: 97 % | DIASTOLIC BLOOD PRESSURE: 76 MMHG | SYSTOLIC BLOOD PRESSURE: 122 MMHG | RESPIRATION RATE: 16 BRPM | BODY MASS INDEX: 31.35 KG/M2 | TEMPERATURE: 97.7 F | HEIGHT: 67 IN | HEART RATE: 81 BPM

## 2021-09-20 DIAGNOSIS — M24.661 FIBROSIS OF RIGHT KNEE JOINT: Primary | ICD-10-CM

## 2021-09-20 PROBLEM — M17.11 OSTEOARTHRITIS OF RIGHT KNEE: Status: RESOLVED | Noted: 2021-06-21 | Resolved: 2021-09-20

## 2021-09-20 PROCEDURE — 25010000002 PROPOFOL 10 MG/ML EMULSION: Performed by: NURSE ANESTHETIST, CERTIFIED REGISTERED

## 2021-09-20 PROCEDURE — 25010000002 FENTANYL CITRATE (PF) 50 MCG/ML SOLUTION: Performed by: NURSE ANESTHETIST, CERTIFIED REGISTERED

## 2021-09-20 PROCEDURE — 25010000002 HYDROMORPHONE PER 4 MG: Performed by: NURSE ANESTHETIST, CERTIFIED REGISTERED

## 2021-09-20 PROCEDURE — 25010000002 METHYLPREDNISOLONE PER 80 MG: Performed by: ORTHOPAEDIC SURGERY

## 2021-09-20 PROCEDURE — 25010000002 DIPHENHYDRAMINE PER 50 MG: Performed by: NURSE ANESTHETIST, CERTIFIED REGISTERED

## 2021-09-20 RX ORDER — DIPHENHYDRAMINE HCL 25 MG
25 CAPSULE ORAL
Status: DISCONTINUED | OUTPATIENT
Start: 2021-09-20 | End: 2021-09-20 | Stop reason: HOSPADM

## 2021-09-20 RX ORDER — HYDROCODONE BITARTRATE AND ACETAMINOPHEN 10; 325 MG/1; MG/1
1 TABLET ORAL EVERY 6 HOURS PRN
COMMUNITY
End: 2021-09-20 | Stop reason: HOSPADM

## 2021-09-20 RX ORDER — FAMOTIDINE 10 MG/ML
20 INJECTION, SOLUTION INTRAVENOUS ONCE
Status: COMPLETED | OUTPATIENT
Start: 2021-09-20 | End: 2021-09-20

## 2021-09-20 RX ORDER — HYDRALAZINE HYDROCHLORIDE 20 MG/ML
5 INJECTION INTRAMUSCULAR; INTRAVENOUS
Status: DISCONTINUED | OUTPATIENT
Start: 2021-09-20 | End: 2021-09-20 | Stop reason: HOSPADM

## 2021-09-20 RX ORDER — PROMETHAZINE HYDROCHLORIDE 25 MG/1
25 TABLET ORAL ONCE AS NEEDED
Status: DISCONTINUED | OUTPATIENT
Start: 2021-09-20 | End: 2021-09-20 | Stop reason: HOSPADM

## 2021-09-20 RX ORDER — ONDANSETRON 2 MG/ML
4 INJECTION INTRAMUSCULAR; INTRAVENOUS ONCE AS NEEDED
Status: DISCONTINUED | OUTPATIENT
Start: 2021-09-20 | End: 2021-09-20 | Stop reason: HOSPADM

## 2021-09-20 RX ORDER — DIPHENHYDRAMINE HYDROCHLORIDE 50 MG/ML
12.5 INJECTION INTRAMUSCULAR; INTRAVENOUS
Status: DISCONTINUED | OUTPATIENT
Start: 2021-09-20 | End: 2021-09-20 | Stop reason: HOSPADM

## 2021-09-20 RX ORDER — NALOXONE HCL 0.4 MG/ML
0.2 VIAL (ML) INJECTION AS NEEDED
Status: DISCONTINUED | OUTPATIENT
Start: 2021-09-20 | End: 2021-09-20 | Stop reason: HOSPADM

## 2021-09-20 RX ORDER — EPHEDRINE SULFATE 50 MG/ML
5 INJECTION, SOLUTION INTRAVENOUS ONCE AS NEEDED
Status: DISCONTINUED | OUTPATIENT
Start: 2021-09-20 | End: 2021-09-20 | Stop reason: HOSPADM

## 2021-09-20 RX ORDER — OXYCODONE AND ACETAMINOPHEN 7.5; 325 MG/1; MG/1
2 TABLET ORAL EVERY 4 HOURS PRN
Status: DISCONTINUED | OUTPATIENT
Start: 2021-09-20 | End: 2021-09-20 | Stop reason: HOSPADM

## 2021-09-20 RX ORDER — LIDOCAINE HYDROCHLORIDE 10 MG/ML
0.5 INJECTION, SOLUTION EPIDURAL; INFILTRATION; INTRACAUDAL; PERINEURAL ONCE AS NEEDED
Status: DISCONTINUED | OUTPATIENT
Start: 2021-09-20 | End: 2021-09-20 | Stop reason: HOSPADM

## 2021-09-20 RX ORDER — FLUMAZENIL 0.1 MG/ML
0.2 INJECTION INTRAVENOUS AS NEEDED
Status: DISCONTINUED | OUTPATIENT
Start: 2021-09-20 | End: 2021-09-20 | Stop reason: HOSPADM

## 2021-09-20 RX ORDER — HYDROCODONE BITARTRATE AND ACETAMINOPHEN 7.5; 325 MG/1; MG/1
1-2 TABLET ORAL EVERY 4 HOURS PRN
Qty: 56 TABLET | Refills: 0 | Status: SHIPPED | OUTPATIENT
Start: 2021-09-20

## 2021-09-20 RX ORDER — LIDOCAINE HYDROCHLORIDE 20 MG/ML
INJECTION, SOLUTION INFILTRATION; PERINEURAL AS NEEDED
Status: DISCONTINUED | OUTPATIENT
Start: 2021-09-20 | End: 2021-09-20 | Stop reason: SURG

## 2021-09-20 RX ORDER — HYDROCODONE BITARTRATE AND ACETAMINOPHEN 7.5; 325 MG/1; MG/1
1 TABLET ORAL ONCE AS NEEDED
Status: DISCONTINUED | OUTPATIENT
Start: 2021-09-20 | End: 2021-09-20 | Stop reason: HOSPADM

## 2021-09-20 RX ORDER — FENTANYL CITRATE 50 UG/ML
50 INJECTION, SOLUTION INTRAMUSCULAR; INTRAVENOUS
Status: DISCONTINUED | OUTPATIENT
Start: 2021-09-20 | End: 2021-09-20 | Stop reason: HOSPADM

## 2021-09-20 RX ORDER — SODIUM CHLORIDE 0.9 % (FLUSH) 0.9 %
3 SYRINGE (ML) INJECTION EVERY 12 HOURS SCHEDULED
Status: DISCONTINUED | OUTPATIENT
Start: 2021-09-20 | End: 2021-09-20 | Stop reason: HOSPADM

## 2021-09-20 RX ORDER — SODIUM CHLORIDE, SODIUM LACTATE, POTASSIUM CHLORIDE, CALCIUM CHLORIDE 600; 310; 30; 20 MG/100ML; MG/100ML; MG/100ML; MG/100ML
9 INJECTION, SOLUTION INTRAVENOUS CONTINUOUS
Status: DISCONTINUED | OUTPATIENT
Start: 2021-09-20 | End: 2021-09-20 | Stop reason: HOSPADM

## 2021-09-20 RX ORDER — OXYCODONE AND ACETAMINOPHEN 10; 325 MG/1; MG/1
1 TABLET ORAL EVERY 4 HOURS PRN
Status: DISCONTINUED | OUTPATIENT
Start: 2021-09-20 | End: 2021-09-20 | Stop reason: HOSPADM

## 2021-09-20 RX ORDER — SODIUM CHLORIDE 0.9 % (FLUSH) 0.9 %
3-10 SYRINGE (ML) INJECTION AS NEEDED
Status: DISCONTINUED | OUTPATIENT
Start: 2021-09-20 | End: 2021-09-20 | Stop reason: HOSPADM

## 2021-09-20 RX ORDER — PROMETHAZINE HYDROCHLORIDE 25 MG/1
25 SUPPOSITORY RECTAL ONCE AS NEEDED
Status: DISCONTINUED | OUTPATIENT
Start: 2021-09-20 | End: 2021-09-20 | Stop reason: HOSPADM

## 2021-09-20 RX ORDER — LABETALOL HYDROCHLORIDE 5 MG/ML
5 INJECTION, SOLUTION INTRAVENOUS
Status: DISCONTINUED | OUTPATIENT
Start: 2021-09-20 | End: 2021-09-20 | Stop reason: HOSPADM

## 2021-09-20 RX ORDER — MIDAZOLAM HYDROCHLORIDE 1 MG/ML
0.5 INJECTION INTRAMUSCULAR; INTRAVENOUS
Status: DISCONTINUED | OUTPATIENT
Start: 2021-09-20 | End: 2021-09-20 | Stop reason: HOSPADM

## 2021-09-20 RX ORDER — PROPOFOL 10 MG/ML
VIAL (ML) INTRAVENOUS AS NEEDED
Status: DISCONTINUED | OUTPATIENT
Start: 2021-09-20 | End: 2021-09-20 | Stop reason: SURG

## 2021-09-20 RX ORDER — HYDROMORPHONE HYDROCHLORIDE 1 MG/ML
0.5 INJECTION, SOLUTION INTRAMUSCULAR; INTRAVENOUS; SUBCUTANEOUS
Status: DISCONTINUED | OUTPATIENT
Start: 2021-09-20 | End: 2021-09-20 | Stop reason: HOSPADM

## 2021-09-20 RX ADMIN — HYDROMORPHONE HYDROCHLORIDE 0.5 MG: 1 INJECTION, SOLUTION INTRAMUSCULAR; INTRAVENOUS; SUBCUTANEOUS at 07:29

## 2021-09-20 RX ADMIN — LIDOCAINE HYDROCHLORIDE 100 MG: 20 INJECTION, SOLUTION INFILTRATION; PERINEURAL at 07:09

## 2021-09-20 RX ADMIN — HYDROMORPHONE HYDROCHLORIDE 0.5 MG: 1 INJECTION, SOLUTION INTRAMUSCULAR; INTRAVENOUS; SUBCUTANEOUS at 07:36

## 2021-09-20 RX ADMIN — HYDROMORPHONE HYDROCHLORIDE 0.5 MG: 1 INJECTION, SOLUTION INTRAMUSCULAR; INTRAVENOUS; SUBCUTANEOUS at 08:04

## 2021-09-20 RX ADMIN — PROPOFOL 80 MG: 10 INJECTION, EMULSION INTRAVENOUS at 07:09

## 2021-09-20 RX ADMIN — FENTANYL CITRATE 50 MCG: 0.05 INJECTION, SOLUTION INTRAMUSCULAR; INTRAVENOUS at 07:31

## 2021-09-20 RX ADMIN — FENTANYL CITRATE 50 MCG: 0.05 INJECTION, SOLUTION INTRAMUSCULAR; INTRAVENOUS at 07:54

## 2021-09-20 RX ADMIN — FENTANYL CITRATE 50 MCG: 0.05 INJECTION, SOLUTION INTRAMUSCULAR; INTRAVENOUS at 07:37

## 2021-09-20 RX ADMIN — FAMOTIDINE 20 MG: 10 INJECTION INTRAVENOUS at 06:20

## 2021-09-20 RX ADMIN — SODIUM CHLORIDE, POTASSIUM CHLORIDE, SODIUM LACTATE AND CALCIUM CHLORIDE 9 ML/HR: 600; 310; 30; 20 INJECTION, SOLUTION INTRAVENOUS at 06:21

## 2021-09-20 RX ADMIN — FENTANYL CITRATE 50 MCG: 0.05 INJECTION, SOLUTION INTRAMUSCULAR; INTRAVENOUS at 07:24

## 2021-09-20 RX ADMIN — DIPHENHYDRAMINE HYDROCHLORIDE 12.5 MG: 50 INJECTION, SOLUTION INTRAMUSCULAR; INTRAVENOUS at 08:12

## 2021-09-20 RX ADMIN — HYDROMORPHONE HYDROCHLORIDE 0.5 MG: 1 INJECTION, SOLUTION INTRAMUSCULAR; INTRAVENOUS; SUBCUTANEOUS at 07:43

## 2021-09-20 RX ADMIN — OXYCODONE AND ACETAMINOPHEN 1 TABLET: 325; 10 TABLET ORAL at 07:45

## 2021-09-20 NOTE — OP NOTE
MANIPULATION OF  Procedure Note    Carlene Cole  9/20/2021    Pre-op Diagnosis: Arthrofibrosis right knee s/p TKA  Post-op Diagnosis:Same  Procedure: Manipulation of right knee under anesthesia and steroid injection  Surgeon:  Gordon Vasquez MD  Anesthesia: General, Anesthesiologist: Jen Saldaña MD  CRNA: Abby Rascon CRNA  Staff: Circulator: Casandra Salvador RN  Scrub Person: Kwaku Blum Jacob CFA  Estimated Blood Loss: none  Specimens: * No orders in the log *  Drains: none  Complications: None    Indication for Procedure:   The patient is a 66 y.o. female  presents today for a manipulation under anesthesia due to arthrofibrosis of their knee post-operatively.  Risks of fracture and recurrent arthrofibrosis and pain.  They understood and wished to proceed.        Procedure:   After the patient was identified in the preoperative area, and the surgical site confirmed and marked, the patient was brought to the operating room on a stretcher.  Time out was performed.  The knee was prepped with alcohol and 80 mg of depomedrol and 7 cc of .5% marcaine with epi was injected into the knee through a medial lion-patellar approach.  Then a manipulation was perfomed.  After manipulation, her ROM was 0 - 130 degrees.   The patient was awaken from anesthetic and was returned to recovery in stable condition.    Gordon Vasquez MD

## 2021-09-20 NOTE — ANESTHESIA PREPROCEDURE EVALUATION
Anesthesia Evaluation     Patient summary reviewed and Nursing notes reviewed                Airway   Mallampati: II  TM distance: >3 FB  Neck ROM: full  No difficulty expected  Dental    (+) upper dentures and lower dentures    Pulmonary - negative pulmonary ROS   Cardiovascular     ECG reviewed  Rhythm: regular  Rate: normal    (+) hypertension,       Neuro/Psych- negative ROS  GI/Hepatic/Renal/Endo    (+) obesity,       Musculoskeletal     Abdominal    Substance History - negative use     OB/GYN negative ob/gyn ROS         Other   arthritis,                      Anesthesia Plan    ASA 3     general   (I have reviewed the patient's history with the patient and the chart, including all pertinent laboratory results and imaging. I have explained the risks of anesthesia including but not limited to dental damage, corneal abrasion, nerve injury, MI, stroke, and death. Questions asked and answered. Anesthetic plan discussed with patient and team as indicated. Patient expressed understanding of the above.     Full upper and lower dentures)  intravenous induction     Anesthetic plan, all risks, benefits, and alternatives have been provided, discussed and informed consent has been obtained with: patient.

## 2021-09-20 NOTE — ANESTHESIA POSTPROCEDURE EVALUATION
"Patient: Carlene Cole    Procedure Summary     Date: 09/20/21 Room / Location: [facility] OR  / [facility] MAIN OR    Anesthesia Start: 0659 Anesthesia Stop: 0723    Procedure: RIGHT KNEE MANIPULATION STEROID INJECTION (Right ) Diagnosis:     Surgeons: Gordon Vasquez MD Provider: Jen Saldaña MD    Anesthesia Type: general ASA Status: 3          Anesthesia Type: general    Vitals  Vitals Value Taken Time   /77 09/20/21 0907   Temp 36.5 °C (97.7 °F) 09/20/21 0815   Pulse 79 09/20/21 0908   Resp 18 09/20/21 0900   SpO2 95 % 09/20/21 0909   Vitals shown include unvalidated device data.        Post Anesthesia Care and Evaluation    Patient location during evaluation: bedside  Patient participation: complete - patient participated  Level of consciousness: awake  Pain management: adequate  Airway patency: patent  Anesthetic complications: No anesthetic complications  PONV Status: controlled  Cardiovascular status: acceptable  Respiratory status: acceptable  Hydration status: acceptable    Comments: /77   Pulse 71   Temp 36.5 °C (97.7 °F) (Oral)   Resp 18   Ht 170.2 cm (67\")   Wt 90.6 kg (199 lb 11.8 oz)   SpO2 92%   BMI 31.28 kg/m²         "

## 2021-09-20 NOTE — DISCHARGE INSTRUCTIONS
Appointment for Physical Therapy with Rosa on 9/21/2021 at 7:30 am at [facility] at [address] ([phone]).  You had your last pain pill at 7:45 this morning.at 7:45.

## 2021-09-20 NOTE — H&P
ORTHOPEDIC SURGERY PRE-OP HISTORY AND PHYSICAL      Patient: Carlene Cole  Date of Admission: 9/20/2021  5:10 AM  YOB: 1955  Medical Record Number: [id number]  Attending Physician: Gordon Vasquez MD  Consulting Physician: Gordon Vasquez MD    CHIEF COMPLAINT: Right knee Pain and stiffness.    HISTORY OF PRESENT ILLINESS: Patient is a 66 y.o. year old female presents to [facility] with above complaints.  The patient failed conservative treatment and patient requested surgical intervention.  The patient presents for a  Right knee manipulation and steroid injection.    ALLERGIES:   Allergies   Allergen Reactions   • Morphine Mental Status Change   • Codeine Palpitations   • Iodine Rash       HOME MEDICATIONS:  Medications Prior to Admission   Medication Sig Dispense Refill Last Dose   • esterified estrogens (Menest) 1.25 MG tablet tablet Take 1.25 mg by mouth Daily.   9/19/2021 at 2200   • gabapentin (NEURONTIN) 800 MG tablet Take 800 mg by mouth Every 4 (Four) Hours As Needed.   9/13/2021   • hydroCHLOROthiazide (HYDRODIURIL) 25 MG tablet Take 25 mg by mouth Daily.   9/19/2021 at 0830   • HYDROcodone-acetaminophen (NORCO)  MG per tablet Take 1 tablet by mouth Every 6 (Six) Hours As Needed for Moderate Pain .   9/19/2021 at 2200   • losartan (COZAAR) 50 MG tablet Take 50 mg by mouth Daily.   9/19/2021 at 0830   • tiZANidine (ZANAFLEX) 4 MG tablet Take 4 mg by mouth At Night As Needed for Muscle Spasms.   9/19/2021 at 2200   • docusate sodium (Colace) 100 MG capsule Take 1-2 capsules by mouth 2 (Two) Times a Day. 60 capsule 1 More than a month at Unknown time   • ondansetron (ZOFRAN) 4 MG tablet Take 1 tablet by mouth Every 6 (Six) Hours As Needed for Nausea or Vomiting. 30 tablet 0 More than a month at Unknown time       Past Medical History:   Diagnosis Date   • Arthritis    • Chronic back pain    • DDD (degenerative disc disease), lumbar    • Decreased range of motion  (ROM) of right knee    • History of anemia    • Hypertension      Past Surgical History:   Procedure Laterality Date   • BLADDER SUSPENSION     • CERVICAL FUSION     • COLONOSCOPY     • GANGLION CYST EXCISION     • HYSTERECTOMY     • LUMBAR DISC SURGERY     • LUMBAR FUSION     • TOTAL KNEE ARTHROPLASTY Right 6/21/2021    Procedure: RIGHT TOTAL KNEE ARTHROPLASTY;  Surgeon: Gordon Vasquez MD;  Location: [facility] OR;  Service: Orthopedics;  Laterality: Right;     Social History     Occupational History   • Not on file   Tobacco Use   • Smoking status: Never Smoker   • Smokeless tobacco: Never Used   Vaping Use   • Vaping Use: Never used   Substance and Sexual Activity   • Alcohol use: Yes     Comment: SOCIAL USE   • Drug use: Not Currently   • Sexual activity: Defer      Social History     Social History Narrative   • Not on file     Family History   Problem Relation Age of Onset   • Malig Hyperthermia Neg Hx        REVIEW OF SYSTEMS:    HEENT: Patient denies any headaches, vision changes, change in hearing, or tinnitus, Patient denies epistaxis, sinus pain, hoarseness, or dysphagia   Pulmonary: Patient denies any cough, congestion, acute change in SOA or wheezing.   Cardiovascular: Patient denies any change in chest pain, dyspnea, palpitations, weakness, intolerance of exercise, varicosities, change in murmur   Gastrointestinal:  Patient denies change in appetite, melena, change in bowel habits.   Genital/Urinary: Patient denies dysuria, change in color of urine, change in frequency of urination, pain with urgency, change in incontinence, retention.   Musculoskeletal: Patient denies complaints of acute changes in symptoms of other joints not mentioned above.   Neurological: Patient denies changes in dizziness, tremor, ataxia, or difficulty in speaking or changes in memory.   Endocrine system: Patient denies acute changes in tremors, palpitations, polyuria, polydipsia, polyphagia, diaphoresis, exophthalmos, or  "goiter.   Psychological: Patient denies thoughts/plans or harming self or other; denies acute changes in depression,  insomnia, night terrors, eamon, disorientation.   Skin: Patient denies any bruising, rashes, discoloration, pruritus,or wounds not mentioned in history of present illness or chief complaint above.   Hematopoietic: Patient denies current bleeding, epistaxis, hematuria, or melena.    PHYSICAL EXAM:   Vitals:  Vitals:    09/17/21 1045 09/20/21 0551   BP:  124/83   BP Location:  Right arm   Patient Position:  Lying   Pulse:  70   Resp:  18   Temp:  98.9 °F (37.2 °C)   TempSrc:  Oral   SpO2:  97%   Weight:  90.6 kg (199 lb 11.8 oz)   Height: 170.2 cm (67.01\") 170.2 cm (67\")       General:  66 y.o. female who appears about stated age.    Alert, cooperative, in no acute distress                       Head:    Normocephalic, without obvious abnormality, atraumatic   Eyes:            Lids and lashes normal, conjunctivae and sclerae normal, no         icterus, no pallor, corneas clear, PERRLA   Ears:    Ears appear intact with no abnormalities noted   Throat:   No oral lesions, no thrush, oral mucosa moist   Neck:   No adenopathy, supple, trachea midline, no JVD   Back:     Limited exam shows no severe kyphosis present,no visible           erythema, no excessive  tenderness to palpation.    Lungs:     Respirations regular, even and unlabored.     Heart:    Normal rate, Pulses palpable   Chest Wall:    No abnormalities observed.   Abdomen:     Normal bowel sounds, no masses, no organomegaly, soft              non-tender, non-distended, no guarding, no rebound                      tenderness   Rectal:     Deferred   Pulses:   Pulses palpable and equal bilaterally   Skin:   No bleeding, bruising or rash   Lymph nodes:   No palpable adenopathy   Extremities:     Right knee: Skin intact.  Painful ROM.  NVI distally.      DIAGNOSTIC TEST:  No visits with results within 2 Day(s) from this visit.   Latest known visit " with results is:   Lab on 09/17/2021   Component Date Value Ref Range Status   • COVID19 09/17/2021 Not Detected  Not Detected - Ref. Range Final       No results found.      ASSESSMENT:  Right knee arthrofibrosis s/p TKA  Patient Active Problem List   Diagnosis   • Osteoarthritis of right knee       PLAN:    Right manipulation and steroid injection under anesthesia.    Risks and benefits of surgical intervention were discussed in detail with the patient.  Risks of infection, fracture, dislocation, extremity length discrepancy, neurovascular injury, persistent pain, medical risks, anesthetic risk, need for additional surgery, deep venous thrombosis, pulmonary embolism and death.      The above diagnosis and treatment plan was discussed with the patient and/or family.  They were educated in both non-surgical and surgical treatment options for their condition.   They were given the opportunity to ask questions and were answered to their satisfaction.  They agreed to proceed with the above treatment plan.        Gordon Vasquez MD  Date: 9/20/2021

## (undated) DEVICE — GLV SURG BIOGEL LTX PF 8 1/2

## (undated) DEVICE — GLV SURG SENSICARE W/ALOE PF LF 8 STRL

## (undated) DEVICE — BNDG ELAS ELITE V/CLOSE 4IN 5YD LF STRL

## (undated) DEVICE — PENCL E/S ULTRAVAC TELESCP NOSE HOLSTR 10FT

## (undated) DEVICE — ADHS SKIN SURG TISS VISC PREMIERPRO EXOFIN HI/VISC FAST/DRY

## (undated) DEVICE — ANTIBACTERIAL UNDYED BRAIDED (POLYGLACTIN 910), SYNTHETIC ABSORBABLE SUTURE: Brand: COATED VICRYL

## (undated) DEVICE — GLV SURG BIOGEL LTX PF 8

## (undated) DEVICE — SOL ISO/ALC RUB 70PCT 4OZ

## (undated) DEVICE — SYRINGE, LUER LOCK, 60ML: Brand: MEDLINE

## (undated) DEVICE — 3M™ IOBAN™ 2 ANTIMICROBIAL INCISE DRAPE 6650EZ: Brand: IOBAN™ 2

## (undated) DEVICE — TRAP FLD MINIVAC MEGADYNE 100ML

## (undated) DEVICE — APPL CHLORAPREP HI/LITE 26ML ORNG

## (undated) DEVICE — SUT VIC 1 CT1 36IN J947H

## (undated) DEVICE — NEEDLE, QUINCKE, 18GX3.5": Brand: MEDLINE

## (undated) DEVICE — PK KN TOTL 40

## (undated) DEVICE — OPTIFOAM GENTLE SA, POSTOP, 4X12: Brand: MEDLINE

## (undated) DEVICE — DRAPE,U/ SHT,SPLIT,PLAS,STERIL: Brand: MEDLINE

## (undated) DEVICE — TRY SKINPREP DRYPREP

## (undated) DEVICE — STRAP STIRUP WO/ RNG

## (undated) DEVICE — MEDICINE CUP, GRADUATED, STER: Brand: MEDLINE

## (undated) DEVICE — PREP SOL POVIDONE/IODINE BT 4OZ

## (undated) DEVICE — DUAL CUT SAGITTAL BLADE